# Patient Record
Sex: FEMALE | Race: OTHER | NOT HISPANIC OR LATINO
[De-identification: names, ages, dates, MRNs, and addresses within clinical notes are randomized per-mention and may not be internally consistent; named-entity substitution may affect disease eponyms.]

---

## 2017-02-17 ENCOUNTER — APPOINTMENT (OUTPATIENT)
Dept: ORTHOPEDIC SURGERY | Facility: CLINIC | Age: 62
End: 2017-02-17

## 2017-08-18 ENCOUNTER — APPOINTMENT (OUTPATIENT)
Dept: ORTHOPEDIC SURGERY | Facility: CLINIC | Age: 62
End: 2017-08-18
Payer: COMMERCIAL

## 2017-08-18 VITALS
WEIGHT: 138 LBS | BODY MASS INDEX: 22.99 KG/M2 | DIASTOLIC BLOOD PRESSURE: 80 MMHG | HEART RATE: 54 BPM | SYSTOLIC BLOOD PRESSURE: 131 MMHG | HEIGHT: 65 IN

## 2017-08-18 PROCEDURE — 99214 OFFICE O/P EST MOD 30 MIN: CPT

## 2017-08-18 PROCEDURE — 72100 X-RAY EXAM L-S SPINE 2/3 VWS: CPT

## 2018-08-20 ENCOUNTER — APPOINTMENT (OUTPATIENT)
Dept: ORTHOPEDIC SURGERY | Facility: CLINIC | Age: 63
End: 2018-08-20
Payer: COMMERCIAL

## 2018-08-20 VITALS — DIASTOLIC BLOOD PRESSURE: 81 MMHG | SYSTOLIC BLOOD PRESSURE: 129 MMHG | HEART RATE: 54 BPM

## 2018-08-20 PROCEDURE — 72100 X-RAY EXAM L-S SPINE 2/3 VWS: CPT

## 2018-08-20 PROCEDURE — 99214 OFFICE O/P EST MOD 30 MIN: CPT

## 2018-09-11 ENCOUNTER — TRANSCRIPTION ENCOUNTER (OUTPATIENT)
Age: 63
End: 2018-09-11

## 2018-12-07 ENCOUNTER — APPOINTMENT (OUTPATIENT)
Dept: ORTHOPEDIC SURGERY | Facility: CLINIC | Age: 63
End: 2018-12-07
Payer: COMMERCIAL

## 2018-12-07 VITALS
SYSTOLIC BLOOD PRESSURE: 143 MMHG | HEIGHT: 65 IN | WEIGHT: 137 LBS | BODY MASS INDEX: 22.82 KG/M2 | HEART RATE: 58 BPM | DIASTOLIC BLOOD PRESSURE: 82 MMHG

## 2018-12-07 DIAGNOSIS — M54.5 LOW BACK PAIN: ICD-10-CM

## 2018-12-07 PROCEDURE — 99214 OFFICE O/P EST MOD 30 MIN: CPT

## 2020-04-30 ENCOUNTER — TRANSCRIPTION ENCOUNTER (OUTPATIENT)
Age: 65
End: 2020-04-30

## 2020-08-21 ENCOUNTER — APPOINTMENT (OUTPATIENT)
Dept: ORTHOPEDIC SURGERY | Facility: CLINIC | Age: 65
End: 2020-08-21
Payer: COMMERCIAL

## 2020-08-21 VITALS
HEART RATE: 54 BPM | BODY MASS INDEX: 23.16 KG/M2 | DIASTOLIC BLOOD PRESSURE: 77 MMHG | WEIGHT: 139 LBS | SYSTOLIC BLOOD PRESSURE: 145 MMHG | HEIGHT: 65 IN

## 2020-08-21 VITALS — TEMPERATURE: 97.9 F

## 2020-08-21 PROCEDURE — 99214 OFFICE O/P EST MOD 30 MIN: CPT

## 2020-08-21 PROCEDURE — 72100 X-RAY EXAM L-S SPINE 2/3 VWS: CPT

## 2020-08-21 NOTE — DISCUSSION/SUMMARY
[de-identified] : 4 years s/p L4-5 laminectomy and fusion-doing well.\par Discussed all options.\par F/U 1 year.\par All options discussed including rest, medicine, home exercise, acupuncture, Chiropractic care, Physical Therapy, Pain management, and last resort surgery. All questions were answered, all alternatives discussed and the patient is in complete agreement with that plan. Follow-up appointment as instructed. Any issues and the patient will call or come in sooner.

## 2020-08-21 NOTE — PHYSICAL EXAM
[de-identified] : 5 out of 5 motor strength, sensation is intact and symmetrical full range of motion flexion extension and rotation, no palpatory tenderness full range of motion of hips knees shoulders and elbows (all four extremities), no atrophy, negative straight leg raise, no pathological reflexes, no swelling, normal ambulation, no apparent distress skin is intact, no palpable lymph nodes, no upper or lower extremity instability, alert and oriented x3 and normal mood. Normal finger-to nose test.  [de-identified] : XR AP Lat Lumbar Spine 8/21/20 - L4-5 laminectomy and fusion-reviewed with the patient.\par \par \par Lumbar AP lateral:L4-5 laminectomy and fusion-hardware in place- no breakdown above-Reviewed with patient.

## 2020-08-21 NOTE — HISTORY OF PRESENT ILLNESS
[de-identified] : 65 year female presents for evaluation of presents for follow up.\par SP L3-L5 laminectomy and fusion L4/5 on 7/21/2016, last seen in Dec 2018 for LBP x3 weeks due to back spasm.\par Has been having mid lower back pain during yoga. Only occurs during yoga.\par Denies radiation of pain.\par Denies numbness/tingling. \par Hyperextending her back exacerbates her pain.\par Diclofenac gives mild relief. \axel Is doing PT for her L hip. \par No fever chills sweats nausea vomiting no bowel or bladder dysfunction, no recent weight loss or gain no night pain. This history is in addition to the intake form that I personally reviewed.

## 2020-11-02 ENCOUNTER — TRANSCRIPTION ENCOUNTER (OUTPATIENT)
Age: 65
End: 2020-11-02

## 2020-11-06 ENCOUNTER — TRANSCRIPTION ENCOUNTER (OUTPATIENT)
Age: 65
End: 2020-11-06

## 2021-01-21 ENCOUNTER — TRANSCRIPTION ENCOUNTER (OUTPATIENT)
Age: 66
End: 2021-01-21

## 2021-01-22 ENCOUNTER — TRANSCRIPTION ENCOUNTER (OUTPATIENT)
Age: 66
End: 2021-01-22

## 2021-06-05 ENCOUNTER — INPATIENT (INPATIENT)
Facility: HOSPITAL | Age: 66
LOS: 2 days | Discharge: HOME | DRG: 101 | End: 2021-06-08
Attending: INTERNAL MEDICINE | Admitting: INTERNAL MEDICINE
Payer: MEDICARE

## 2021-06-05 VITALS
DIASTOLIC BLOOD PRESSURE: 76 MMHG | RESPIRATION RATE: 18 BRPM | TEMPERATURE: 99 F | OXYGEN SATURATION: 100 % | HEART RATE: 83 BPM | HEIGHT: 65 IN | WEIGHT: 141.1 LBS | SYSTOLIC BLOOD PRESSURE: 148 MMHG

## 2021-06-05 DIAGNOSIS — D64.9 ANEMIA, UNSPECIFIED: ICD-10-CM

## 2021-06-05 DIAGNOSIS — Z98.89 OTHER SPECIFIED POSTPROCEDURAL STATES: Chronic | ICD-10-CM

## 2021-06-05 DIAGNOSIS — Z29.9 ENCOUNTER FOR PROPHYLACTIC MEASURES, UNSPECIFIED: ICD-10-CM

## 2021-06-05 DIAGNOSIS — R56.9 UNSPECIFIED CONVULSIONS: ICD-10-CM

## 2021-06-05 LAB
ALBUMIN SERPL ELPH-MCNC: 3.8 G/DL — SIGNIFICANT CHANGE UP (ref 3.5–5)
ALP SERPL-CCNC: 58 U/L — SIGNIFICANT CHANGE UP (ref 40–120)
ALT FLD-CCNC: 26 U/L DA — SIGNIFICANT CHANGE UP (ref 10–60)
AMPHET UR-MCNC: NEGATIVE — SIGNIFICANT CHANGE UP
ANION GAP SERPL CALC-SCNC: 7 MMOL/L — SIGNIFICANT CHANGE UP (ref 5–17)
ANISOCYTOSIS BLD QL: SLIGHT — SIGNIFICANT CHANGE UP
APPEARANCE UR: CLEAR — SIGNIFICANT CHANGE UP
AST SERPL-CCNC: 23 U/L — SIGNIFICANT CHANGE UP (ref 10–40)
BARBITURATES UR SCN-MCNC: NEGATIVE — SIGNIFICANT CHANGE UP
BASOPHILS # BLD AUTO: 0.04 K/UL — SIGNIFICANT CHANGE UP (ref 0–0.2)
BASOPHILS NFR BLD AUTO: 0.5 % — SIGNIFICANT CHANGE UP (ref 0–2)
BENZODIAZ UR-MCNC: NEGATIVE — SIGNIFICANT CHANGE UP
BILIRUB SERPL-MCNC: 0.4 MG/DL — SIGNIFICANT CHANGE UP (ref 0.2–1.2)
BILIRUB UR-MCNC: NEGATIVE — SIGNIFICANT CHANGE UP
BUN SERPL-MCNC: 12 MG/DL — SIGNIFICANT CHANGE UP (ref 7–18)
CALCIUM SERPL-MCNC: 8.9 MG/DL — SIGNIFICANT CHANGE UP (ref 8.4–10.5)
CHLORIDE SERPL-SCNC: 106 MMOL/L — SIGNIFICANT CHANGE UP (ref 96–108)
CO2 SERPL-SCNC: 27 MMOL/L — SIGNIFICANT CHANGE UP (ref 22–31)
COCAINE METAB.OTHER UR-MCNC: NEGATIVE — SIGNIFICANT CHANGE UP
COLOR SPEC: YELLOW — SIGNIFICANT CHANGE UP
CREAT SERPL-MCNC: 0.72 MG/DL — SIGNIFICANT CHANGE UP (ref 0.5–1.3)
DIFF PNL FLD: NEGATIVE — SIGNIFICANT CHANGE UP
EOSINOPHIL # BLD AUTO: 0.04 K/UL — SIGNIFICANT CHANGE UP (ref 0–0.5)
EOSINOPHIL NFR BLD AUTO: 0.5 % — SIGNIFICANT CHANGE UP (ref 0–6)
EPI CELLS # UR: SIGNIFICANT CHANGE UP /HPF
GLUCOSE SERPL-MCNC: 76 MG/DL — SIGNIFICANT CHANGE UP (ref 70–99)
GLUCOSE UR QL: NEGATIVE — SIGNIFICANT CHANGE UP
HCT VFR BLD CALC: 31.3 % — LOW (ref 34.5–45)
HGB BLD-MCNC: 10.8 G/DL — LOW (ref 11.5–15.5)
HYPOCHROMIA BLD QL: SIGNIFICANT CHANGE UP
IMM GRANULOCYTES NFR BLD AUTO: 0.3 % — SIGNIFICANT CHANGE UP (ref 0–1.5)
KETONES UR-MCNC: ABNORMAL
LEUKOCYTE ESTERASE UR-ACNC: NEGATIVE — SIGNIFICANT CHANGE UP
LYMPHOCYTES # BLD AUTO: 2 K/UL — SIGNIFICANT CHANGE UP (ref 1–3.3)
LYMPHOCYTES # BLD AUTO: 22.7 % — SIGNIFICANT CHANGE UP (ref 13–44)
MANUAL SMEAR VERIFICATION: SIGNIFICANT CHANGE UP
MCHC RBC-ENTMCNC: 24.6 PG — LOW (ref 27–34)
MCHC RBC-ENTMCNC: 34.5 GM/DL — SIGNIFICANT CHANGE UP (ref 32–36)
MCV RBC AUTO: 71.3 FL — LOW (ref 80–100)
METHADONE UR-MCNC: NEGATIVE — SIGNIFICANT CHANGE UP
MONOCYTES # BLD AUTO: 0.36 K/UL — SIGNIFICANT CHANGE UP (ref 0–0.9)
MONOCYTES NFR BLD AUTO: 4.1 % — SIGNIFICANT CHANGE UP (ref 2–14)
NEUTROPHILS # BLD AUTO: 6.33 K/UL — SIGNIFICANT CHANGE UP (ref 1.8–7.4)
NEUTROPHILS NFR BLD AUTO: 71.9 % — SIGNIFICANT CHANGE UP (ref 43–77)
NITRITE UR-MCNC: NEGATIVE — SIGNIFICANT CHANGE UP
NRBC # BLD: 0 /100 WBCS — SIGNIFICANT CHANGE UP (ref 0–0)
OPIATES UR-MCNC: NEGATIVE — SIGNIFICANT CHANGE UP
PCP SPEC-MCNC: SIGNIFICANT CHANGE UP
PCP UR-MCNC: NEGATIVE — SIGNIFICANT CHANGE UP
PH UR: 8 — SIGNIFICANT CHANGE UP (ref 5–8)
PLAT MORPH BLD: NORMAL — SIGNIFICANT CHANGE UP
PLATELET # BLD AUTO: 397 K/UL — SIGNIFICANT CHANGE UP (ref 150–400)
PLATELET COUNT - ESTIMATE: NORMAL — SIGNIFICANT CHANGE UP
POTASSIUM SERPL-MCNC: 3.9 MMOL/L — SIGNIFICANT CHANGE UP (ref 3.5–5.3)
POTASSIUM SERPL-SCNC: 3.9 MMOL/L — SIGNIFICANT CHANGE UP (ref 3.5–5.3)
PROT SERPL-MCNC: 7.6 G/DL — SIGNIFICANT CHANGE UP (ref 6–8.3)
PROT UR-MCNC: NEGATIVE — SIGNIFICANT CHANGE UP
RBC # BLD: 4.39 M/UL — SIGNIFICANT CHANGE UP (ref 3.8–5.2)
RBC # FLD: 17.6 % — HIGH (ref 10.3–14.5)
RBC BLD AUTO: ABNORMAL
RBC CASTS # UR COMP ASSIST: SIGNIFICANT CHANGE UP /HPF (ref 0–2)
SARS-COV-2 RNA SPEC QL NAA+PROBE: SIGNIFICANT CHANGE UP
SODIUM SERPL-SCNC: 140 MMOL/L — SIGNIFICANT CHANGE UP (ref 135–145)
SP GR SPEC: 1.01 — SIGNIFICANT CHANGE UP (ref 1.01–1.02)
TARGETS BLD QL SMEAR: SIGNIFICANT CHANGE UP
THC UR QL: NEGATIVE — SIGNIFICANT CHANGE UP
UROBILINOGEN FLD QL: NEGATIVE — SIGNIFICANT CHANGE UP
WBC # BLD: 8.8 K/UL — SIGNIFICANT CHANGE UP (ref 3.8–10.5)
WBC # FLD AUTO: 8.8 K/UL — SIGNIFICANT CHANGE UP (ref 3.8–10.5)
WBC UR QL: SIGNIFICANT CHANGE UP /HPF (ref 0–5)

## 2021-06-05 PROCEDURE — 70450 CT HEAD/BRAIN W/O DYE: CPT | Mod: 26,MA

## 2021-06-05 PROCEDURE — 99285 EMERGENCY DEPT VISIT HI MDM: CPT

## 2021-06-05 PROCEDURE — 99232 SBSQ HOSP IP/OBS MODERATE 35: CPT

## 2021-06-05 RX ORDER — SODIUM CHLORIDE 9 MG/ML
1000 INJECTION INTRAMUSCULAR; INTRAVENOUS; SUBCUTANEOUS
Refills: 0 | Status: DISCONTINUED | OUTPATIENT
Start: 2021-06-05 | End: 2021-06-08

## 2021-06-05 RX ORDER — ONDANSETRON 8 MG/1
4 TABLET, FILM COATED ORAL EVERY 6 HOURS
Refills: 0 | Status: DISCONTINUED | OUTPATIENT
Start: 2021-06-05 | End: 2021-06-08

## 2021-06-05 RX ORDER — ACETAMINOPHEN 500 MG
650 TABLET ORAL EVERY 6 HOURS
Refills: 0 | Status: DISCONTINUED | OUTPATIENT
Start: 2021-06-05 | End: 2021-06-08

## 2021-06-05 RX ORDER — SODIUM CHLORIDE 9 MG/ML
1000 INJECTION INTRAMUSCULAR; INTRAVENOUS; SUBCUTANEOUS ONCE
Refills: 0 | Status: COMPLETED | OUTPATIENT
Start: 2021-06-05 | End: 2021-06-05

## 2021-06-05 RX ORDER — ASPIRIN/CALCIUM CARB/MAGNESIUM 324 MG
81 TABLET ORAL DAILY
Refills: 0 | Status: DISCONTINUED | OUTPATIENT
Start: 2021-06-05 | End: 2021-06-08

## 2021-06-05 RX ORDER — ATORVASTATIN CALCIUM 80 MG/1
40 TABLET, FILM COATED ORAL AT BEDTIME
Refills: 0 | Status: DISCONTINUED | OUTPATIENT
Start: 2021-06-05 | End: 2021-06-08

## 2021-06-05 RX ORDER — ENOXAPARIN SODIUM 100 MG/ML
40 INJECTION SUBCUTANEOUS DAILY
Refills: 0 | Status: DISCONTINUED | OUTPATIENT
Start: 2021-06-05 | End: 2021-06-08

## 2021-06-05 RX ADMIN — Medication 81 MILLIGRAM(S): at 11:44

## 2021-06-05 RX ADMIN — SODIUM CHLORIDE 80 MILLILITER(S): 9 INJECTION INTRAMUSCULAR; INTRAVENOUS; SUBCUTANEOUS at 21:49

## 2021-06-05 RX ADMIN — Medication 650 MILLIGRAM(S): at 23:43

## 2021-06-05 RX ADMIN — Medication 650 MILLIGRAM(S): at 21:41

## 2021-06-05 RX ADMIN — SODIUM CHLORIDE 80 MILLILITER(S): 9 INJECTION INTRAMUSCULAR; INTRAVENOUS; SUBCUTANEOUS at 11:46

## 2021-06-05 RX ADMIN — ENOXAPARIN SODIUM 40 MILLIGRAM(S): 100 INJECTION SUBCUTANEOUS at 11:44

## 2021-06-05 RX ADMIN — SODIUM CHLORIDE 2000 MILLILITER(S): 9 INJECTION INTRAMUSCULAR; INTRAVENOUS; SUBCUTANEOUS at 08:19

## 2021-06-05 NOTE — ED ADULT NURSE NOTE - NSIMPLEMENTINTERV_GEN_ALL_ED
Implemented All Universal Safety Interventions:  Burden to call system. Call bell, personal items and telephone within reach. Instruct patient to call for assistance. Room bathroom lighting operational. Non-slip footwear when patient is off stretcher. Physically safe environment: no spills, clutter or unnecessary equipment. Stretcher in lowest position, wheels locked, appropriate side rails in place.

## 2021-06-05 NOTE — H&P ADULT - PROBLEM SELECTOR PLAN 2
noted to have Hgb of 10.8   appears to be stable from prior labs  f/u anemia panel  monitor CBC daily

## 2021-06-05 NOTE — EEG REPORT - NS EEG TEXT BOX
[facility]   COMPREHENSIVE EPILEPSY CENTER   REPORT OF ROUTINE VIDEO EEG     [facility]: [address], [place] [address], Ph#: [phone]  [facility]:  [address], [place] [address], Ph#: [phone]  [facility]: [address] [place] [address], Ph#: [phone]    Patient Name: LONDON MONTGOMERY  Age and : 65y ([birth year])  MRN #: [id number]  Location: [place] [address]  Referring Physician: Stefan Esqueda    Study Date: 21    _____________________________________________________________  TECHNICAL INFORMATION    Placement and Labeling of Electrodes:  The EEG was performed utilizing 20 channels referential EEG connections (coronal over temporal over parasagittal montage) using all standard 10-20 electrode placements with EKG.  Recording was at a sampling rate of 256 samples per second per channel.  Time synchronized digital video recording was done simultaneously with EEG recording.  A low light infrared camera was used for low light recording.  Virgilio and seizure detection algorithms were utilized.    _____________________________________________________________  HISTORY    Patient is a 65y old  Female who presents with a chief complaint of seizure (2021 13:01)    PERTINENT MEDICATION:  none  _____________________________________________________________  STUDY INTERPRETATION    Findings: The background was continuous, spontaneously variable and reactive. During wakefulness, the posterior dominant rhythm consisted of symmetric, well-modulated 9 Hz activity, with amplitude to 30 uV, that attenuated to eye opening.  Low amplitude frontal beta was noted in wakefulness.    Background Slowing:  No generalized background slowing was present.    Focal Slowing:   None were present.    Sleep Background:  Stage II sleep transients were not recorded.    Other Non-Epileptiform Findings:  Diffuse excess beta activity.    Interictal Epileptiform Activity:   None were present.    Events:  Clinical events: None recorded.  Seizures: None recorded.    Activation Procedures:   Hyperventilation was not performed.    Photic stimulation was not performed.     Artifacts:  Intermittent myogenic and movement artifacts were noted.    ECG:  The heart rate on single channel ECG was predominantly between 60-70 BPM.    _____________________________________________________________  EEG SUMMARY/CLASSIFICATION    Normal EEG in the awake, drowsy and asleep states.  .Diffuse excess beta activity.    _____________________________________________________________  EEG IMPRESSION/CLINICAL CORRELATE    Otherwise normal EEG, except for diffuse excess beta activity, which may be seen with medication use such as benzodiazepines or barbiturates.  No epileptiform pattern or seizure seen.    Isaac Arriaga MD PGY-5  Epilepsy Fellow    This Preliminary report is based on fellow review. Final report pending attending review.    Reading Room: [phone]  On Call Service After Hours: [phone] [facility]   COMPREHENSIVE EPILEPSY CENTER   REPORT OF ROUTINE VIDEO EEG     [facility]: [address], [place] [address], Ph#: [phone]  [facility]:  [address], [place] [address], Ph#: [phone]  [facility]: [address] [place] [address], Ph#: [phone]    Patient Name: LONDON MONTGOMERY  Age and : 65y (57)  MRN #: [id number]  Location: [place] [address]  Referring Physician: Stefan Esqueda    Study Date: 21    _____________________________________________________________  TECHNICAL INFORMATION    Placement and Labeling of Electrodes:  The EEG was performed utilizing 20 channels referential EEG connections (coronal over temporal over parasagittal montage) using all standard 10-20 electrode placements with EKG.  Recording was at a sampling rate of 256 samples per second per channel.  Time synchronized digital video recording was done simultaneously with EEG recording.  A low light infrared camera was used for low light recording.  Virgilio and seizure detection algorithms were utilized.    _____________________________________________________________  HISTORY    Patient is a 65y old  Female who presents with a chief complaint of seizure (2021 13:01)    PERTINENT MEDICATION:  none  _____________________________________________________________  STUDY INTERPRETATION    Findings: The background was continuous, spontaneously variable and reactive. During wakefulness, the posterior dominant rhythm consisted of symmetric, well-modulated 9 Hz activity, with amplitude to 30 uV, that attenuated to eye opening.  Low amplitude frontal beta was noted in wakefulness.    Background Slowing:  No generalized background slowing was present.    Focal Slowing:   Intermittent polymorphic delta slowing in the left anterior-temporal (max F7) region.    Sleep Background:  Stage II sleep transients were not recorded.    Other Non-Epileptiform Findings:  Diffuse excess beta activity.    Interictal Epileptiform Activity:   One possible left anterior-temporal (max F7) spike-and-slow wave complex.     Events:  Clinical events: None recorded.  Seizures: None recorded.    Activation Procedures:   Hyperventilation was not performed.    Photic stimulation was not performed.     Artifacts:  Intermittent myogenic and movement artifacts were noted.    ECG:  The heart rate on single channel ECG was predominantly between 60-70 BPM.    _____________________________________________________________  EEG SUMMARY/CLASSIFICATION    Abnormal EEG in the awake, drowsy and asleep states.  - One possible left anterior-temporal (max F7) spike-and-slow wave complex.   - Intermittent polymorphic delta slowing in the left anterior-temporal (max F7) region.  - Diffuse excess beta activity.    _____________________________________________________________  EEG IMPRESSION/CLINICAL CORRELATE    Abnormal EEG study.  1. One possible left anterior-temporal discharge, suggesting potential epileptiform focus. If clinically indicated, recommend prolonged recording for further evaluation.   2. Functional abnormality in the left anterior-temporal region.   3. Diffuse excess beta activity may be seen with medication use such as benzodiazepines or barbiturates.  4. No seizure seen.     _____________________________________________________________    Isaac Arriaga MD PGY-5  Epilepsy Fellow    Timothy Abdul MD  Attending Physician, [facility]     Reading Room: [phone]  On Call Service After Hours: [phone]

## 2021-06-05 NOTE — H&P ADULT - ASSESSMENT
Patient is a 65 year old female from home AAO x3, with PMH of Raynaud's phenomenon, who presented to the ED due to new onset seizure.    Hgb of 10.8. CT head negative. Given 1L NS bolus in ED.

## 2021-06-05 NOTE — H&P ADULT - PROBLEM SELECTOR PLAN 1
patient presented to the ED due to new onset seizure  CT head negative   UA and Utox negative   f/u MRI with epilepsy protocol; MRI screening form done and placed in chart   f/u EEG   neuro, Dr. Taylor consulted  seizure precautions

## 2021-06-05 NOTE — H&P ADULT - NSHPSOCIALHISTORY_GEN_ALL_CORE
Patient lives at home with her . Denies any smoking, alcohol use or use of illicit drugs. Patient states only occasionally using THC aid to help with sleeping.

## 2021-06-05 NOTE — ED ADULT TRIAGE NOTE - CHIEF COMPLAINT QUOTE
As per  he was woken up by pt whimpering then scream and then went into convulsions that lasted 2-3 mins, she was foaming a little from the mouth and he noticed saliva in her mouth. pt denies having history of seizure.

## 2021-06-05 NOTE — CONSULT NOTE ADULT - SUBJECTIVE AND OBJECTIVE BOX
Patient is a 65y old  Female who presents with a chief complaint of seizure (05 Jun 2021 09:58)      HPI:  Seizure generalized observed/witnessed by her , in middle of sleep; tongue laceration occured; also reports occasional strange smells.    PAST MEDICAL & SURGICAL HISTORY:  Raynauds phenomenon    History of D&amp;C    H/O colonoscopy        FAMILY HISTORY:        Social Hx:  Nonsmoker, no drug or alcohol use    MEDICATIONS  (STANDING):  aspirin  chewable 81 milliGRAM(s) Oral daily  atorvastatin 40 milliGRAM(s) Oral at bedtime  enoxaparin Injectable 40 milliGRAM(s) SubCutaneous daily  sodium chloride 0.9%. 1000 milliLiter(s) (80 mL/Hr) IV Continuous <Continuous>       Allergies    Bactrim (Hives)    Intolerances        ROS: Pertinent positives in HPI, all other ROS were reviewed and are negative.      Vital Signs Last 24 Hrs  T(C): 37.9 (05 Jun 2021 11:48), Max: 37.9 (05 Jun 2021 11:48)  T(F): 100.3 (05 Jun 2021 11:48), Max: 100.3 (05 Jun 2021 11:48)  HR: 68 (05 Jun 2021 11:48) (68 - 83)  BP: 97/62 (05 Jun 2021 11:48) (97/62 - 148/76)  BP(mean): --  RR: 15 (05 Jun 2021 11:48) (15 - 18)  SpO2: 99% (05 Jun 2021 11:48) (99% - 100%)        Constitutional: awake and alert.  HEENT: PERRLA, EOMI,   Neck: Supple.  Respiratory: Breath sounds are clear bilaterally  Cardiovascular: S1 and S2, regular rhythm  Gastrointestinal: soft, nontender  Extremities:  no edema  Vascular: Carotid Bruit - no  Musculoskeletal: no joint swelling/tenderness, no abnormal movements  Skin: No rashes    Neurological exam:  HF: A x O x 3. Appropriately interactive, normal affect. Speech fluent, No Aphasia or paraphasic errors. Naming /repetition intact   CN: KELVIN, EOMI, VFF, facial sensation normal, no NLFD, tongue midline, Palate moves equally, SCM equal bilaterally  Motor: No pronator drift, Strength 5/5 in all 4 ext, normal bulk and tone, no tremor, rigidity or bradykinesia.    Sens: Intact to light touch / PP/ VS/ JS    Reflexes: Symmetric and normal . BJ 2+, BR 2+, KJ 2+, AJ 2+, downgoing toes b/l  Coord:  No FNFA, dysmetria, STEPHEN intact   Gait/Balance: Normal/  NIHSS: 0  MRS 0          Labs:                        10.8   8.80  )-----------( 397      ( 05 Jun 2021 08:22 )             31.3     06-05    140  |  106  |  12  ----------------------------<  76  3.9   |  27  |  0.72    Ca    8.9      05 Jun 2021 08:22    TPro  7.6  /  Alb  3.8  /  TBili  0.4  /  DBili  x   /  AST  23  /  ALT  26  /  AlkPhos  58  06-05            Radiology report:  - CT head:  < from: CT Head No Cont (06.05.21 @ 08:41) >  EXAM:  CT BRAIN                            PROCEDURE DATE:  06/05/2021          INTERPRETATION:  CLINICAL STATEMENT: Seizure    TECHNIQUE: CT of the head was performed without IV contrast.  RAPID artificial intelligence was utilized for the preliminary evaluation of intracranial hemorrhage.    COMPARISON: None.    FINDINGS:  There is no acute intracranial hemorrhage, parenchymal mass, mass effect or midline shift. There is no acute territorial infarct. There is no hydrocephalus.    The cranium is intact. The visualized paranasal sinuses are well-aerated.    IMPRESSION:  No acute intracranial hemorrhage or acute territorial infarct.  If symptoms persist, follow-up MRI exam recommended.      TERRELL HELTON MD; Attending Radiologist  This document has been electronically signed. Jun 5 2021  8:44AM    < end of copied text >

## 2021-06-05 NOTE — ED PROVIDER NOTE - OBJECTIVE STATEMENT
66 y/o F presents to the ED s/p seizure.  witnessed patient have a generalized seizure in bed that lasted 1-2 minutes. Patient reportedly not acting like herself until 40 minutes after. Patient is complaining of L sided tongue pain and believes she may have bitten her tongue. Denies headache, neck pain, abdominal pain, chest pain, shortness of breath or any other acute complaints.

## 2021-06-05 NOTE — H&P ADULT - NSHPSOURCEINFORD_GEN_ALL_CORE
Notified pt of results. Summary   This is a limited echo to assess EF and RVSP. Left ventricular systolic function is normal with an ejection fraction of   55-60%. No regional wall motion abnormality. Mildly enlarged right heart. Mild tricuspid regurgitation. Right ventricular systolic pressure of 41 mmHg consistent with mild   pulmonary hypertension. No evidence of pericardial effusion. Chart(s)/Patient/Spouse/Significant Other

## 2021-06-05 NOTE — H&P ADULT - HISTORY OF PRESENT ILLNESS
Patient is a 65 year old female from home AAO x3, with PMH of Raynaud's phenomenon and spinal surgery, who presented to the ED due to  new onset seizure. Patient states that around 3:45am she had gotten up from bed and then went back to sleep and then does not recall what happened afterwards.  at bedside to provide further history.  states that patient was noted to be moaning around 5am as if she was having a nightmare which has happened before. Patient then had screamed and then went back to the bed and noticed her eyes were half rolled back and patient was convulsing. Patient had bit her tongue and also had some drooling/froth from her mouth. Seizure lasted about 2 minutes and patient was post ictal for about 30-40 mins prior to returning to baseline mental status. Patient denies any prior history of seizures. Denies any smoking, drinking or illicit drug use. Patient states that this past week, she had two episodes where she felt like she smelled something bad like rotten eggs and had whole body shake but denies any loss of consciousness or confusion during episode. Patient gets migraine about 1-2x/year but is not on any medications.

## 2021-06-05 NOTE — CONSULT NOTE ADULT - ASSESSMENT
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

## 2021-06-06 LAB
24R-OH-CALCIDIOL SERPL-MCNC: 34.4 NG/ML — SIGNIFICANT CHANGE UP (ref 30–80)
A1C WITH ESTIMATED AVERAGE GLUCOSE RESULT: 5 % — SIGNIFICANT CHANGE UP (ref 4–5.6)
ANION GAP SERPL CALC-SCNC: 8 MMOL/L — SIGNIFICANT CHANGE UP (ref 5–17)
APTT BLD: 29.2 SEC — SIGNIFICANT CHANGE UP (ref 27.5–35.5)
BASOPHILS # BLD AUTO: 0.05 K/UL — SIGNIFICANT CHANGE UP (ref 0–0.2)
BASOPHILS NFR BLD AUTO: 0.6 % — SIGNIFICANT CHANGE UP (ref 0–2)
BUN SERPL-MCNC: 9 MG/DL — SIGNIFICANT CHANGE UP (ref 7–18)
CALCIUM SERPL-MCNC: 8.5 MG/DL — SIGNIFICANT CHANGE UP (ref 8.4–10.5)
CHLORIDE SERPL-SCNC: 110 MMOL/L — HIGH (ref 96–108)
CHOLEST SERPL-MCNC: 207 MG/DL — HIGH
CO2 SERPL-SCNC: 24 MMOL/L — SIGNIFICANT CHANGE UP (ref 22–31)
COVID-19 SPIKE DOMAIN AB INTERP: POSITIVE
COVID-19 SPIKE DOMAIN ANTIBODY RESULT: >250 U/ML — HIGH
CREAT SERPL-MCNC: 0.64 MG/DL — SIGNIFICANT CHANGE UP (ref 0.5–1.3)
CULTURE RESULTS: SIGNIFICANT CHANGE UP
EOSINOPHIL # BLD AUTO: 0.33 K/UL — SIGNIFICANT CHANGE UP (ref 0–0.5)
EOSINOPHIL NFR BLD AUTO: 4.1 % — SIGNIFICANT CHANGE UP (ref 0–6)
ESTIMATED AVERAGE GLUCOSE: 97 MG/DL — SIGNIFICANT CHANGE UP (ref 68–114)
FERRITIN SERPL-MCNC: 22 NG/ML — SIGNIFICANT CHANGE UP (ref 15–150)
FOLATE SERPL-MCNC: >20 NG/ML — SIGNIFICANT CHANGE UP
GLUCOSE SERPL-MCNC: 85 MG/DL — SIGNIFICANT CHANGE UP (ref 70–99)
HCT VFR BLD CALC: 27.4 % — LOW (ref 34.5–45)
HCV AB S/CO SERPL IA: 0.12 S/CO — SIGNIFICANT CHANGE UP (ref 0–0.99)
HCV AB SERPL-IMP: SIGNIFICANT CHANGE UP
HDLC SERPL-MCNC: 101 MG/DL — SIGNIFICANT CHANGE UP
HGB BLD-MCNC: 9.6 G/DL — LOW (ref 11.5–15.5)
IMM GRANULOCYTES NFR BLD AUTO: 0.4 % — SIGNIFICANT CHANGE UP (ref 0–1.5)
INR BLD: 1.1 RATIO — SIGNIFICANT CHANGE UP (ref 0.88–1.16)
IRON SATN MFR SERPL: 26 % — SIGNIFICANT CHANGE UP (ref 15–50)
IRON SATN MFR SERPL: 81 UG/DL — SIGNIFICANT CHANGE UP (ref 40–150)
LIPID PNL WITH DIRECT LDL SERPL: 94 MG/DL — SIGNIFICANT CHANGE UP
LYMPHOCYTES # BLD AUTO: 3.34 K/UL — HIGH (ref 1–3.3)
LYMPHOCYTES # BLD AUTO: 41.5 % — SIGNIFICANT CHANGE UP (ref 13–44)
MAGNESIUM SERPL-MCNC: 2.2 MG/DL — SIGNIFICANT CHANGE UP (ref 1.6–2.6)
MCHC RBC-ENTMCNC: 24.9 PG — LOW (ref 27–34)
MCHC RBC-ENTMCNC: 35 GM/DL — SIGNIFICANT CHANGE UP (ref 32–36)
MCV RBC AUTO: 71 FL — LOW (ref 80–100)
MONOCYTES # BLD AUTO: 0.47 K/UL — SIGNIFICANT CHANGE UP (ref 0–0.9)
MONOCYTES NFR BLD AUTO: 5.8 % — SIGNIFICANT CHANGE UP (ref 2–14)
NEUTROPHILS # BLD AUTO: 3.83 K/UL — SIGNIFICANT CHANGE UP (ref 1.8–7.4)
NEUTROPHILS NFR BLD AUTO: 47.6 % — SIGNIFICANT CHANGE UP (ref 43–77)
NON HDL CHOLESTEROL: 106 MG/DL — SIGNIFICANT CHANGE UP
NRBC # BLD: 0 /100 WBCS — SIGNIFICANT CHANGE UP (ref 0–0)
PHOSPHATE SERPL-MCNC: 3.2 MG/DL — SIGNIFICANT CHANGE UP (ref 2.5–4.5)
PLATELET # BLD AUTO: 365 K/UL — SIGNIFICANT CHANGE UP (ref 150–400)
POTASSIUM SERPL-MCNC: 3.7 MMOL/L — SIGNIFICANT CHANGE UP (ref 3.5–5.3)
POTASSIUM SERPL-SCNC: 3.7 MMOL/L — SIGNIFICANT CHANGE UP (ref 3.5–5.3)
PROTHROM AB SERPL-ACNC: 13 SEC — SIGNIFICANT CHANGE UP (ref 10.6–13.6)
RBC # BLD: 3.86 M/UL — SIGNIFICANT CHANGE UP (ref 3.8–5.2)
RBC # FLD: 17.8 % — HIGH (ref 10.3–14.5)
SARS-COV-2 IGG+IGM SERPL QL IA: >250 U/ML — HIGH
SARS-COV-2 IGG+IGM SERPL QL IA: POSITIVE
SODIUM SERPL-SCNC: 142 MMOL/L — SIGNIFICANT CHANGE UP (ref 135–145)
SPECIMEN SOURCE: SIGNIFICANT CHANGE UP
TIBC SERPL-MCNC: 313 UG/DL — SIGNIFICANT CHANGE UP (ref 250–450)
TRIGL SERPL-MCNC: 62 MG/DL — SIGNIFICANT CHANGE UP
UIBC SERPL-MCNC: 232 UG/DL — SIGNIFICANT CHANGE UP (ref 110–370)
VIT B12 SERPL-MCNC: 571 PG/ML — SIGNIFICANT CHANGE UP (ref 232–1245)
WBC # BLD: 8.05 K/UL — SIGNIFICANT CHANGE UP (ref 3.8–10.5)
WBC # FLD AUTO: 8.05 K/UL — SIGNIFICANT CHANGE UP (ref 3.8–10.5)

## 2021-06-06 PROCEDURE — 71045 X-RAY EXAM CHEST 1 VIEW: CPT | Mod: 26

## 2021-06-06 PROCEDURE — 99231 SBSQ HOSP IP/OBS SF/LOW 25: CPT

## 2021-06-06 RX ADMIN — ENOXAPARIN SODIUM 40 MILLIGRAM(S): 100 INJECTION SUBCUTANEOUS at 11:40

## 2021-06-06 RX ADMIN — SODIUM CHLORIDE 80 MILLILITER(S): 9 INJECTION INTRAMUSCULAR; INTRAVENOUS; SUBCUTANEOUS at 06:03

## 2021-06-06 RX ADMIN — Medication 81 MILLIGRAM(S): at 11:40

## 2021-06-06 NOTE — CHART NOTE - NSCHARTNOTEFT_GEN_A_CORE
1/8/19 saw pt today with Dr. Nelli Rodriguez for pre chemo cycle 2 FOLFIRINOX. She is recovering from recent hospitalization for renal infarct. She is on blood thinners. PO intake is improving slowly. Will change percocet to oxycodone per insurance approval.  Will delay cycle 2 for 1 week. Encouraged to call with any concerns. Navigation will continue to follow. EVENT: Low grade fever: Patient admitted for new onet seizure now with low grade fever. UA negative. No leukocytosis.  Patient asymptomatic. CXR ordered to r/o infectious process. Acetaminophen ordered PRN for fever    OBJECTIVE:  Vital Signs Last 24 Hrs  T(C): 37.1 (06 Jun 2021 05:32), Max: 38.2 (05 Jun 2021 20:54)  T(F): 98.7 (06 Jun 2021 05:32), Max: 100.7 (05 Jun 2021 20:54)  HR: 57 (06 Jun 2021 05:32) (57 - 71)  BP: 93/56 (06 Jun 2021 05:32) (93/56 - 120/72)  BP(mean): --  RR: 18 (06 Jun 2021 05:32) (15 - 18)  SpO2: 98% (06 Jun 2021 05:32) (96% - 100%)    FOCUSED PHYSICAL EXAM:    LABS:                        10.8   8.80  )-----------( 397      ( 05 Jun 2021 08:22 )             31.3     06-05    140  |  106  |  12  ----------------------------<  76  3.9   |  27  |  0.72    Ca    8.9      05 Jun 2021 08:22    TPro  7.6  /  Alb  3.8  /  TBili  0.4  /  DBili  x   /  AST  23  /  ALT  26  /  AlkPhos  58  06-05      EKG:   IMGAGING:    ASSESSMENT:  HPI:  Patient is a 65 year old female from home AAO x3, with PMH of Raynaud's phenomenon and spinal surgery, who presented to the ED due to  new onset seizure. Patient states that around 3:45am she had gotten up from bed and then went back to sleep and then does not recall what happened afterwards.  at bedside to provide further history.  states that patient was noted to be moaning around 5am as if she was having a nightmare which has happened before. Patient then had screamed and then went back to the bed and noticed her eyes were half rolled back and patient was convulsing. Patient had bit her tongue and also had some drooling/froth from her mouth. Seizure lasted about 2 minutes and patient was post ictal for about 30-40 mins prior to returning to baseline mental status. Patient denies any prior history of seizures. Denies any smoking, drinking or illicit drug use. Patient states that this past week, she had two episodes where she felt like she smelled something bad like rotten eggs and had whole body shake but denies any loss of consciousness or confusion during episode. Patient gets migraine about 1-2x/year but is not on any medications.  (05 Jun 2021 09:58)      PLAN: Fever of unknown origin  - CXR ordered. Follow up  -Continue Tylenol pRN for fever    FOLLOW UP / RESULT:

## 2021-06-07 LAB
ANION GAP SERPL CALC-SCNC: 8 MMOL/L — SIGNIFICANT CHANGE UP (ref 5–17)
BASOPHILS # BLD AUTO: 0.03 K/UL — SIGNIFICANT CHANGE UP (ref 0–0.2)
BASOPHILS NFR BLD AUTO: 0.4 % — SIGNIFICANT CHANGE UP (ref 0–2)
BUN SERPL-MCNC: 13 MG/DL — SIGNIFICANT CHANGE UP (ref 7–18)
CALCIUM SERPL-MCNC: 8.6 MG/DL — SIGNIFICANT CHANGE UP (ref 8.4–10.5)
CHLORIDE SERPL-SCNC: 108 MMOL/L — SIGNIFICANT CHANGE UP (ref 96–108)
CO2 SERPL-SCNC: 26 MMOL/L — SIGNIFICANT CHANGE UP (ref 22–31)
CREAT SERPL-MCNC: 0.76 MG/DL — SIGNIFICANT CHANGE UP (ref 0.5–1.3)
EOSINOPHIL # BLD AUTO: 0.37 K/UL — SIGNIFICANT CHANGE UP (ref 0–0.5)
EOSINOPHIL NFR BLD AUTO: 5.3 % — SIGNIFICANT CHANGE UP (ref 0–6)
GLUCOSE SERPL-MCNC: 80 MG/DL — SIGNIFICANT CHANGE UP (ref 70–99)
HCT VFR BLD CALC: 29.1 % — LOW (ref 34.5–45)
HGB BLD-MCNC: 10.1 G/DL — LOW (ref 11.5–15.5)
IMM GRANULOCYTES NFR BLD AUTO: 0.1 % — SIGNIFICANT CHANGE UP (ref 0–1.5)
LYMPHOCYTES # BLD AUTO: 3.69 K/UL — HIGH (ref 1–3.3)
LYMPHOCYTES # BLD AUTO: 53.3 % — HIGH (ref 13–44)
MAGNESIUM SERPL-MCNC: 2 MG/DL — SIGNIFICANT CHANGE UP (ref 1.6–2.6)
MCHC RBC-ENTMCNC: 24.8 PG — LOW (ref 27–34)
MCHC RBC-ENTMCNC: 34.7 GM/DL — SIGNIFICANT CHANGE UP (ref 32–36)
MCV RBC AUTO: 71.5 FL — LOW (ref 80–100)
MONOCYTES # BLD AUTO: 0.42 K/UL — SIGNIFICANT CHANGE UP (ref 0–0.9)
MONOCYTES NFR BLD AUTO: 6.1 % — SIGNIFICANT CHANGE UP (ref 2–14)
NEUTROPHILS # BLD AUTO: 2.4 K/UL — SIGNIFICANT CHANGE UP (ref 1.8–7.4)
NEUTROPHILS NFR BLD AUTO: 34.8 % — LOW (ref 43–77)
NRBC # BLD: 0 /100 WBCS — SIGNIFICANT CHANGE UP (ref 0–0)
PHOSPHATE SERPL-MCNC: 3.9 MG/DL — SIGNIFICANT CHANGE UP (ref 2.5–4.5)
PLATELET # BLD AUTO: 342 K/UL — SIGNIFICANT CHANGE UP (ref 150–400)
POTASSIUM SERPL-MCNC: 3.7 MMOL/L — SIGNIFICANT CHANGE UP (ref 3.5–5.3)
POTASSIUM SERPL-SCNC: 3.7 MMOL/L — SIGNIFICANT CHANGE UP (ref 3.5–5.3)
RBC # BLD: 4.07 M/UL — SIGNIFICANT CHANGE UP (ref 3.8–5.2)
RBC # FLD: 17.9 % — HIGH (ref 10.3–14.5)
SODIUM SERPL-SCNC: 142 MMOL/L — SIGNIFICANT CHANGE UP (ref 135–145)
WBC # BLD: 6.92 K/UL — SIGNIFICANT CHANGE UP (ref 3.8–10.5)
WBC # FLD AUTO: 6.92 K/UL — SIGNIFICANT CHANGE UP (ref 3.8–10.5)

## 2021-06-07 PROCEDURE — 70553 MRI BRAIN STEM W/O & W/DYE: CPT | Mod: 26

## 2021-06-07 NOTE — PROGRESS NOTE ADULT - SUBJECTIVE AND OBJECTIVE BOX
INTERVAL HPI/OVERNIGHT EVENTS:  Low grade fever: Patient admitted for new onet seizure now with low grade fever. UA negative. No leukocytosis.  Patient asymptomatic. CXR ordered to r/o infectious process. Acetaminophen ordered PRN for fever    HEALTH ISSUES - PROBLEM Dx:  Seizure  Anemia  Need for prophylactic measure            MEDICATIONS  (STANDING):  aspirin  chewable 81 milliGRAM(s) Oral daily  atorvastatin 40 milliGRAM(s) Oral at bedtime  enoxaparin Injectable 40 milliGRAM(s) SubCutaneous daily  sodium chloride 0.9%. 1000 milliLiter(s) (80 mL/Hr) IV Continuous <Continuous>    MEDICATIONS  (PRN):  acetaminophen   Tablet .. 650 milliGRAM(s) Oral every 6 hours PRN Temp greater or equal to 38C (100.4F), Moderate Pain (4 - 6)  LORazepam   Injectable 2 milliGRAM(s) IV Push every 4 hours PRN seizure  ondansetron Injectable 4 milliGRAM(s) IV Push every 6 hours PRN Nausea and/or Vomiting      Allergies    Bactrim (Hives)    Intolerances        REVIEW OF SYSTEMS        Vital Signs Last 24 Hrs  T(C): 37 (2021 13:57), Max: 38.2 (2021 20:54)  T(F): 98.6 (2021 13:57), Max: 100.7 (2021 20:54)  HR: 63 (2021 13:57) (57 - 71)  BP: 100/64 (2021 13:57) (93/56 - 120/72)  BP(mean): --  RR: 16 (2021 13:57) (16 - 18)  SpO2: 98% (2021 13:57) (96% - 99%)    PHYSICAL EXAM:    Constitutional: awake and alert.  HEENT: PERRLA, EOMI,   Neck: Supple.  Respiratory: Breath sounds are clear bilaterally  Cardiovascular: S1 and S2, regular rhythm  Gastrointestinal: soft, nontender  Extremities:  no edema  Vascular: Carotid Bruit - no  Musculoskeletal: no joint swelling/tenderness, no abnormal movements  Skin: No rashes    Neurological exam:  HF: A x O x 3. Appropriately interactive, normal affect. Speech fluent, No Aphasia or paraphasic errors. Naming /repetition intact   CN: KELVIN, EOMI, VFF, facial sensation normal, no NLFD, tongue midline, Palate moves equally, SCM equal bilaterally  Motor: No pronator drift, Strength 5/5 in all 4 ext, normal bulk and tone, no tremor, rigidity or bradykinesia.    Sens: Intact to light touch / PP/ VS/ JS    Reflexes: Symmetric and normal . BJ 2+, BR 2+, KJ 2+, AJ 2+, downgoing toes b/l  Coord:  No FNFA, dysmetria, STEPHEN intact   Gait/Balance: Normal/  NIHSS: 0  MRS 0    LABS:                        9.6    8.05  )-----------( 365      ( 2021 07:35 )             27.4     06-06    142  |  110<H>  |  9   ----------------------------<  85  3.7   |  24  |  0.64    Ca    8.5      2021 07:35  Phos  3.2     06-06  Mg     2.2     06-06    TPro  7.6  /  Alb  3.8  /  TBili  0.4  /  DBili  x   /  AST  23  /  ALT  26  /  AlkPhos  58  06-05    PT/INR - ( 2021 07:35 )   PT: 13.0 sec;   INR: 1.10 ratio         PTT - ( 2021 07:35 )  PTT:29.2 sec  Urinalysis Basic - ( 2021 09:32 )    Color: Yellow / Appearance: Clear / S.015 / pH: x  Gluc: x / Ketone: Trace  / Bili: Negative / Urobili: Negative   Blood: x / Protein: Negative / Nitrite: Negative   Leuk Esterase: Negative / RBC: 0-2 /HPF / WBC 0-2 /HPF   Sq Epi: x / Non Sq Epi: Few /HPF / Bacteria: x        RADIOLOGY & ADDITIONAL TESTS:  < from: CT Head No Cont (21 @ 08:41) >  EXAM:  CT BRAIN                            PROCEDURE DATE:  2021          INTERPRETATION:  CLINICAL STATEMENT: Seizure    TECHNIQUE: CT of the head was performed without IV contrast.  RAPID artificial intelligence was utilized for the preliminary evaluation of intracranial hemorrhage.    COMPARISON: None.    FINDINGS:  There is no acute intracranial hemorrhage, parenchymal mass, mass effect or midline shift. There is no acute territorial infarct. There is no hydrocephalus.    The cranium is intact. The visualized paranasal sinuses are well-aerated.    IMPRESSION:  No acute intracranial hemorrhage or acute territorial infarct.  If symptoms persist, follow-up MRI exam recommended.            TERRELL HELTON MD; Attending Radiologist  This document has been electronically signed. 2021  8:44AM    < end of copied text >  EEG  HISTORY    Patient is a 65y old  Female who presents with a chief complaint of seizure (2021 13:01)    PERTINENT MEDICATION:  none  _____________________________________________________________  STUDY INTERPRETATION    Findings: The background was continuous, spontaneously variable and reactive. During wakefulness, the posterior dominant rhythm consisted of symmetric, well-modulated 9 Hz activity, with amplitude to 30 uV, that attenuated to eye opening.  Low amplitude frontal beta was noted in wakefulness.    Background Slowing:  No generalized background slowing was present.    Focal Slowing:   Intermittent polymorphic delta slowing in the left anterior-temporal (max F7) region.    Sleep Background:  Stage II sleep transients were not recorded.    Other Non-Epileptiform Findings:  Diffuse excess beta activity.    Interictal Epileptiform Activity:   One possible left anterior-temporal (max F7) spike-and-slow wave complex.     Events:  Clinical events: None recorded.  Seizures: None recorded.    Activation Procedures:   Hyperventilation was not performed.    Photic stimulation was not performed.     Artifacts:  Intermittent myogenic and movement artifacts were noted.    ECG:  The heart rate on single channel ECG was predominantly between 60-70 BPM.    _____________________________________________________________  EEG SUMMARY/CLASSIFICATION    Abnormal EEG in the awake, drowsy and asleep states.  - One possible left anterior-temporal (max F7) spike-and-slow wave complex.   - Intermittent polymorphic delta slowing in the left anterior-temporal (max F7) region.  - Diffuse excess beta activity.    _____________________________________________________________  EEG IMPRESSION/CLINICAL CORRELATE    Abnormal EEG study.  1. One possible left anterior-temporal discharge, suggesting potential epileptiform focus. If clinically indicated, recommend prolonged recording for further evaluation.   2. Functional abnormality in the left anterior-temporal region.   3. Diffuse excess beta activity may be seen with medication use such as benzodiazepines or barbiturates.  4. No seizure seen.     _____________________________________________________________    Isaac Arriaga MD PGY-5  Epilepsy Fellow    Timothy Abdul MD  Attending Physician, [facility]     Reading Room: [phone]  On Call Service After Hours: [phone]
NP Note discussed with  Primary Attending    Patient is a 65y old  Female who presents with a chief complaint of seizure (06 Jun 2021 17:29)      INTERVAL HPI/OVERNIGHT EVENTS: No acute medical complaints    MEDICATIONS  (STANDING):  aspirin  chewable 81 milliGRAM(s) Oral daily  atorvastatin 40 milliGRAM(s) Oral at bedtime  enoxaparin Injectable 40 milliGRAM(s) SubCutaneous daily  sodium chloride 0.9%. 1000 milliLiter(s) (80 mL/Hr) IV Continuous <Continuous>    MEDICATIONS  (PRN):  acetaminophen   Tablet .. 650 milliGRAM(s) Oral every 6 hours PRN Temp greater or equal to 38C (100.4F), Moderate Pain (4 - 6)  LORazepam   Injectable 2 milliGRAM(s) IV Push every 4 hours PRN seizure  ondansetron Injectable 4 milliGRAM(s) IV Push every 6 hours PRN Nausea and/or Vomiting      __________________________________________________  REVIEW OF SYSTEMS:    CONSTITUTIONAL: No fever,   EYES: no acute visual disturbances  NECK: No pain or stiffness  RESPIRATORY: No cough; No shortness of breath  CARDIOVASCULAR: No chest pain, no palpitations  GASTROINTESTINAL: No pain. No nausea or vomiting; No diarrhea   NEUROLOGICAL: No headache or numbness, no tremors  MUSCULOSKELETAL: No joint pain, no muscle pain  GENITOURINARY: no dysuria, no frequency, no hesitancy  PSYCHIATRY: no depression , no anxiety  ALL OTHER  ROS negative        Vital Signs Last 24 Hrs  T(C): 36.7 (07 Jun 2021 06:04), Max: 37 (06 Jun 2021 13:57)  T(F): 98.1 (07 Jun 2021 06:04), Max: 98.6 (06 Jun 2021 13:57)  HR: 58 (07 Jun 2021 06:04) (58 - 63)  BP: 122/68 (07 Jun 2021 06:04) (100/64 - 122/68)  BP(mean): --  RR: 17 (07 Jun 2021 06:04) (16 - 17)  SpO2: 98% (07 Jun 2021 06:04) (97% - 98%)    ________________________________________________  PHYSICAL EXAM:  GENERAL: NAD  HEENT: Normocephalic;  conjunctivae and sclerae clear;  NECK : supple  CHEST/LUNG: Clear to auscultation bilaterally with good air entry   HEART: S1 S2  regular; no murmurs, gallops or rubs  ABDOMEN: Soft, Nontender, Nondistended; Bowel sounds present  EXTREMITIES: no cyanosis; no edema; no calf tenderness  SKIN: warm and dry; no rash  NERVOUS SYSTEM:  Awake and alert; Oriented  to place, person and time     _________________________________________________  LABS:                        10.1   6.92  )-----------( 342      ( 07 Jun 2021 07:05 )             29.1     06-07    142  |  108  |  13  ----------------------------<  80  3.7   |  26  |  0.76    Ca    8.6      07 Jun 2021 07:05  Phos  3.9     06-07  Mg     2.0     06-07      PT/INR - ( 06 Jun 2021 07:35 )   PT: 13.0 sec;   INR: 1.10 ratio         PTT - ( 06 Jun 2021 07:35 )  PTT:29.2 sec    CAPILLARY BLOOD GLUCOSE            RADIOLOGY & ADDITIONAL TESTS:   < from: CT Head No Cont (06.05.21 @ 08:41) >  IMPRESSION:  No acute intracranial hemorrhage or acute territorial infarct.  If symptoms persist, follow-up MRI exam recommended.    < end of copied text >    Imaging Personally Reviewed:  YES    Consultant(s) Notes Reviewed:   YES    Plan of care was discussed with patient and /or primary care giver; all questions and concerns were addressed and care was aligned with patient's wishes.

## 2021-06-07 NOTE — PROGRESS NOTE ADULT - ASSESSMENT
Patient is a 65 year old female from home whos only PMHx is Raynaud's phenomenon, who presents to the ED after a tonic clonic seziure. Admitted to medicine for new onset seizure work up.  CT head unremarkable for acute pathology,  EEG abnormal. Neurology consulted reccs: MRI and LP given low grade fevers. 
New onset seizure; new low grade fever; plan spinal tap consult ID for possible acyclovir await MRI

## 2021-06-07 NOTE — PROGRESS NOTE ADULT - PROBLEM SELECTOR PLAN 1
new onset seizure  CT head negative   UA and Utox negative   f/u MRI with epilepsy protocol; MRI screening form done and placed in chart   Abnormal EEG   neuro, Dr. Taylor following   seizure precautions

## 2021-06-08 ENCOUNTER — TRANSCRIPTION ENCOUNTER (OUTPATIENT)
Age: 66
End: 2021-06-08

## 2021-06-08 VITALS
RESPIRATION RATE: 18 BRPM | SYSTOLIC BLOOD PRESSURE: 103 MMHG | HEART RATE: 51 BPM | TEMPERATURE: 98 F | DIASTOLIC BLOOD PRESSURE: 63 MMHG | OXYGEN SATURATION: 97 %

## 2021-06-08 LAB
ANION GAP SERPL CALC-SCNC: 7 MMOL/L — SIGNIFICANT CHANGE UP (ref 5–17)
BUN SERPL-MCNC: 13 MG/DL — SIGNIFICANT CHANGE UP (ref 7–18)
CALCIUM SERPL-MCNC: 9 MG/DL — SIGNIFICANT CHANGE UP (ref 8.4–10.5)
CHLORIDE SERPL-SCNC: 108 MMOL/L — SIGNIFICANT CHANGE UP (ref 96–108)
CO2 SERPL-SCNC: 26 MMOL/L — SIGNIFICANT CHANGE UP (ref 22–31)
CREAT SERPL-MCNC: 0.67 MG/DL — SIGNIFICANT CHANGE UP (ref 0.5–1.3)
GLUCOSE SERPL-MCNC: 85 MG/DL — SIGNIFICANT CHANGE UP (ref 70–99)
HCT VFR BLD CALC: 30.8 % — LOW (ref 34.5–45)
HGB BLD-MCNC: 10.6 G/DL — LOW (ref 11.5–15.5)
MCHC RBC-ENTMCNC: 24.4 PG — LOW (ref 27–34)
MCHC RBC-ENTMCNC: 34.4 GM/DL — SIGNIFICANT CHANGE UP (ref 32–36)
MCV RBC AUTO: 71 FL — LOW (ref 80–100)
NRBC # BLD: 0 /100 WBCS — SIGNIFICANT CHANGE UP (ref 0–0)
PLATELET # BLD AUTO: 390 K/UL — SIGNIFICANT CHANGE UP (ref 150–400)
POTASSIUM SERPL-MCNC: 3.9 MMOL/L — SIGNIFICANT CHANGE UP (ref 3.5–5.3)
POTASSIUM SERPL-SCNC: 3.9 MMOL/L — SIGNIFICANT CHANGE UP (ref 3.5–5.3)
RBC # BLD: 4.34 M/UL — SIGNIFICANT CHANGE UP (ref 3.8–5.2)
RBC # FLD: 17.4 % — HIGH (ref 10.3–14.5)
SODIUM SERPL-SCNC: 141 MMOL/L — SIGNIFICANT CHANGE UP (ref 135–145)
WBC # BLD: 6.66 K/UL — SIGNIFICANT CHANGE UP (ref 3.8–10.5)
WBC # FLD AUTO: 6.66 K/UL — SIGNIFICANT CHANGE UP (ref 3.8–10.5)

## 2021-06-08 PROCEDURE — 83550 IRON BINDING TEST: CPT

## 2021-06-08 PROCEDURE — 85610 PROTHROMBIN TIME: CPT

## 2021-06-08 PROCEDURE — 80061 LIPID PANEL: CPT

## 2021-06-08 PROCEDURE — 87635 SARS-COV-2 COVID-19 AMP PRB: CPT

## 2021-06-08 PROCEDURE — 82607 VITAMIN B-12: CPT

## 2021-06-08 PROCEDURE — 99285 EMERGENCY DEPT VISIT HI MDM: CPT

## 2021-06-08 PROCEDURE — 83735 ASSAY OF MAGNESIUM: CPT

## 2021-06-08 PROCEDURE — 82728 ASSAY OF FERRITIN: CPT

## 2021-06-08 PROCEDURE — 84100 ASSAY OF PHOSPHORUS: CPT

## 2021-06-08 PROCEDURE — 87086 URINE CULTURE/COLONY COUNT: CPT

## 2021-06-08 PROCEDURE — 71045 X-RAY EXAM CHEST 1 VIEW: CPT

## 2021-06-08 PROCEDURE — 82746 ASSAY OF FOLIC ACID SERUM: CPT

## 2021-06-08 PROCEDURE — 83540 ASSAY OF IRON: CPT

## 2021-06-08 PROCEDURE — 70450 CT HEAD/BRAIN W/O DYE: CPT

## 2021-06-08 PROCEDURE — 82306 VITAMIN D 25 HYDROXY: CPT

## 2021-06-08 PROCEDURE — 95957 EEG DIGITAL ANALYSIS: CPT

## 2021-06-08 PROCEDURE — 86341 ISLET CELL ANTIBODY: CPT

## 2021-06-08 PROCEDURE — 80048 BASIC METABOLIC PNL TOTAL CA: CPT

## 2021-06-08 PROCEDURE — 83036 HEMOGLOBIN GLYCOSYLATED A1C: CPT

## 2021-06-08 PROCEDURE — 86769 SARS-COV-2 COVID-19 ANTIBODY: CPT

## 2021-06-08 PROCEDURE — 70553 MRI BRAIN STEM W/O & W/DYE: CPT

## 2021-06-08 PROCEDURE — 85730 THROMBOPLASTIN TIME PARTIAL: CPT

## 2021-06-08 PROCEDURE — 95819 EEG AWAKE AND ASLEEP: CPT

## 2021-06-08 PROCEDURE — 86803 HEPATITIS C AB TEST: CPT

## 2021-06-08 PROCEDURE — 85025 COMPLETE CBC W/AUTO DIFF WBC: CPT

## 2021-06-08 PROCEDURE — 86255 FLUORESCENT ANTIBODY SCREEN: CPT

## 2021-06-08 PROCEDURE — 36415 COLL VENOUS BLD VENIPUNCTURE: CPT

## 2021-06-08 PROCEDURE — 80307 DRUG TEST PRSMV CHEM ANLYZR: CPT

## 2021-06-08 PROCEDURE — 81001 URINALYSIS AUTO W/SCOPE: CPT

## 2021-06-08 PROCEDURE — 82962 GLUCOSE BLOOD TEST: CPT

## 2021-06-08 PROCEDURE — 93005 ELECTROCARDIOGRAM TRACING: CPT

## 2021-06-08 PROCEDURE — 80053 COMPREHEN METABOLIC PANEL: CPT

## 2021-06-08 PROCEDURE — 85027 COMPLETE CBC AUTOMATED: CPT

## 2021-06-08 RX ORDER — ACETAMINOPHEN 500 MG
2 TABLET ORAL
Qty: 0 | Refills: 0 | DISCHARGE
Start: 2021-06-08

## 2021-06-08 NOTE — DISCHARGE NOTE PROVIDER - NSDCCPCAREPLAN_GEN_ALL_CORE_FT
PRINCIPAL DISCHARGE DIAGNOSIS  Diagnosis: Seizure  Assessment and Plan of Treatment: You were admitted to the hospital after a siezure. All of your imaging was negative for acute pathology. You were seen and evaluated by a Neurologist who you should follow up with in the next week. Please call Dr Taylor's office to schedule an appointment as soon as possible.   [phone].   A seizure is a burst of electrical activity in your brain. A seizure may start in one part of your brain, or both sides may be affected. The seizure may last a few seconds or up to 5 minutes. A new-onset seizure is a seizure that happens for the first time. Some common triggers are alcohol, drugs, lack of sleep, fever, or an infection. High or low blood sugar levels, pregnancy, a head injury, or a stroke could also trigger a seizure. The cause of your seizure may not be known. You have a higher risk for another seizure within the next 2 years. What you can do to manage or prevent a seizure:  Manage stress. Stress can trigger a seizure. Exercise can help you reduce stress. Talk to your healthcare provider about exercise that is safe for you. Other ways to manage stress include yoga, meditation, and biofeedback. Illness can be a form of stress. Eat a variety of healthy foods and drink plenty of liquids during an illness.  Set a regular sleep schedule. A lack of sleep can trigger a seizure. Try to go to sleep and wake up at the same times every day. Keep your bedroom quiet and dark. Talk to your healthcare provider if you are having trouble sleeping.  Manage other medical conditions. Manage other health conditions that may increase your risk for a seizure. Keep your blood sugar levels and blood pressure under control.  Limit or do not drink alcohol as directed. Alcohol can trigger a seizure, especially if you drink a large amount at one time. A drink of alcohol is 12 ounces of beer, 1½ ounces of liquor, or 5 ounces of wine. Talk to your healthcare provider about a safe amount of alcohol for you. Your provider may recommend that you do not drink any alcohol. Tell him or her if you n

## 2021-06-08 NOTE — DISCHARGE NOTE PROVIDER - CARE PROVIDERS DIRECT ADDRESSES
[email or web address],DirectAddress_Unknown

## 2021-06-08 NOTE — DISCHARGE NOTE PROVIDER - NSDCMRMEDTOKEN_GEN_ALL_CORE_FT
acetaminophen 325 mg oral tablet: 2 tab(s) orally every 6 hours, As needed, Temp greater or equal to 38C (100.4F), Moderate Pain (4 - 6)

## 2021-06-08 NOTE — DISCHARGE NOTE NURSING/CASE MANAGEMENT/SOCIAL WORK - PATIENT PORTAL LINK FT
You can access the FollowMyHealth Patient Portal offered by [facility] by registering at the following website: [email or web address]. By joining [employer]’s FollowMyHealth portal, you will also be able to view your health information using other applications (apps) compatible with our system.

## 2021-06-08 NOTE — DISCHARGE NOTE PROVIDER - HOSPITAL COURSE
Patient is a 65 year old female from home whos only PMHx is Raynaud's phenomenon, presents to the ED after an episode tonic clonic siezure. Admitted to medicine for new onset seizure work up.  Both MRI and CT head unremarkable for acute pathology,  EEG abnormal. Neurology consulted, given the grossly normal imaging no need for lumbar puncture or antisiezure meds.   Given patient remained without siezure activity, and afebrile decision was made to discharge patient home with  outpatient follow up. \    Discharge discussed with attending     This is only a brief summary of patient's hospital stay, for full course please see EMR Patient is a 65 year old female from home whos only PMHx is Raynaud's phenomenon, presents to the ED after an episode tonic clonic siezure. Admitted to medicine for new onset seizure work up.  Both MRI and CT head unremarkable for acute pathology,  EEG abnormal. Neurology consulted, given the grossly normal imaging no need for lumbar puncture or antisiezure meds.   Given patient remained without siezure activity, and afebrile decision was made to discharge patient home with  outpatient follow up. \    Discharge discussed with attending     This is only a brief summary of patient's hospital stay, for full course please see EMR      -*-*-*-* INCOMPLETE 6/8 Patient is a 65 year old female from home whos only PMHx is Raynaud's phenomenon, presents to the ED after an episode tonic clonic siezure. Admitted to medicine for new onset seizure work up.  Both MRI and CT head unremarkable for acute pathology,  EEG abnormal. Neurology consulted, given the grossly normal imaging no need for lumbar puncture or antisiezure meds.   Given patient remained without siezure activity, and afebrile decision was made to discharge patient home with  outpatient follow up. No need for anticonvulsants. Wants to follow up with her own Neurologist.   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     Discharge discussed with attending     This is only a brief summary of patient's hospital stay, for full course please see EMR

## 2021-06-08 NOTE — DISCHARGE NOTE PROVIDER - CARE PROVIDER_API CALL
Yvonne Taylor)  Clinical Neurophysiology; Neurology  [address] [facility], [address]nd [place] [address]  Phone: [phone]  Fax: [phone]  Follow Up Time: 1-3 days    Flako Sood  Your PCP  Phone: (   )    -  Fax: (   )    -  Established Patient  Follow Up Time: 1 week   Yvonne Taylor)  Clinical Neurophysiology; Neurology  [address] [facility], [address]nd [place] [address]  Phone: [phone]  Fax: [phone]  Follow Up Time: 1-3 days    Stefan Falcon  NEUROLOGY  [address] [address]  Phone: [phone]  Fax: [phone]  Established Patient  Follow Up Time: 1-3 days    Flako Sood  Your PCP  Phone: (   )    -  Fax: (   )    -  Established Patient  Follow Up Time: 1 week

## 2021-06-08 NOTE — DISCHARGE NOTE PROVIDER - PROVIDER TOKENS
PROVIDER:[TOKEN:[[id number]],FOLLOWUP:[1-3 days]],FREE:[LAST:[Sood],FIRST:[Amadur],PHONE:[(   )    -],FAX:[(   )    -],ADDRESS:[Your PCP],FOLLOWUP:[1 week],ESTABLISHEDPATIENT:[T]] PROVIDER:[TOKEN:[[id number]],FOLLOWUP:[1-3 days]],PROVIDER:[TOKEN:[[id number]],FOLLOWUP:[1-3 days],ESTABLISHEDPATIENT:[T]],FREE:[LAST:[Sood],FIRST:[Amadur],PHONE:[(   )    -],FAX:[(   )    -],ADDRESS:[Your PCP],FOLLOWUP:[1 week],ESTABLISHEDPATIENT:[T]]

## 2021-06-11 PROBLEM — I73.00 RAYNAUD'S SYNDROME WITHOUT GANGRENE: Chronic | Status: ACTIVE | Noted: 2021-06-05

## 2021-06-14 LAB — PARANEOPLASTIC AB PNL SER: SIGNIFICANT CHANGE UP

## 2021-07-12 ENCOUNTER — APPOINTMENT (OUTPATIENT)
Dept: NEUROLOGY | Facility: CLINIC | Age: 66
End: 2021-07-12
Payer: MEDICARE

## 2021-07-12 VITALS
BODY MASS INDEX: 22.99 KG/M2 | TEMPERATURE: 97.2 F | OXYGEN SATURATION: 98 % | WEIGHT: 138 LBS | SYSTOLIC BLOOD PRESSURE: 123 MMHG | DIASTOLIC BLOOD PRESSURE: 77 MMHG | HEART RATE: 77 BPM | HEIGHT: 65 IN

## 2021-07-12 DIAGNOSIS — R56.9 UNSPECIFIED CONVULSIONS: ICD-10-CM

## 2021-07-12 PROCEDURE — 99214 OFFICE O/P EST MOD 30 MIN: CPT

## 2021-07-12 NOTE — DISCUSSION/SUMMARY
[FreeTextEntry1] : Exhibited images of MRI to show the lacunar infarcts reported; discussed the abnormal EEG and MRI as reasons to suspect recurrent seizures; discussed the statistical increase in the prevalence/incidence of seizures in the age-group above 60 years. Family history of strokes in aunts were also revealed. Does not wish to use anti-convulsants.\par Recommend postponement of anticonvulsant until after 72 hour ambulatory surgery

## 2021-07-12 NOTE — PHYSICAL EXAM
[Oriented To Time, Place, And Person] : oriented to person, place, and time [Impaired Insight] : insight and judgment were intact [Affect] : the affect was normal [Person] : oriented to person [Place] : oriented to place [Time] : oriented to time [Concentration Intact] : normal concentrating ability [Visual Intact] : visual attention was ~T not ~L decreased [Naming Objects] : no difficulty naming common objects [Repeating Phrases] : no difficulty repeating a phrase [Writing A Sentence] : no difficulty writing a sentence [Fluency] : fluency intact [Comprehension] : comprehension intact [Reading] : reading intact [Past History] : adequate knowledge of personal past history [Cranial Nerves Optic (II)] : visual acuity intact bilaterally,  visual fields full to confrontation, pupils equal round and reactive to light [Cranial Nerves Oculomotor (III)] : extraocular motion intact [Cranial Nerves Trigeminal (V)] : facial sensation intact symmetrically [Cranial Nerves Facial (VII)] : face symmetrical [Cranial Nerves Vestibulocochlear (VIII)] : hearing was intact bilaterally [Cranial Nerves Glossopharyngeal (IX)] : tongue and palate midline [Cranial Nerves Accessory (XI - Cranial And Spinal)] : head turning and shoulder shrug symmetric [Cranial Nerves Hypoglossal (XII)] : there was no tongue deviation with protrusion [Motor Tone] : muscle tone was normal in all four extremities [Motor Strength] : muscle strength was normal in all four extremities [No Muscle Atrophy] : normal bulk in all four extremities [Sensation Tactile Decrease] : light touch was intact [Abnormal Walk] : normal gait [Balance] : balance was intact [2+] : Ankle jerk left 2+ [Past-pointing] : there was no past-pointing [Tremor] : no tremor present [Plantar Reflex Right Only] : normal on the right [Plantar Reflex Left Only] : normal on the left

## 2021-08-10 ENCOUNTER — NON-APPOINTMENT (OUTPATIENT)
Age: 66
End: 2021-08-10

## 2021-08-17 ENCOUNTER — APPOINTMENT (OUTPATIENT)
Dept: NEUROLOGY | Facility: CLINIC | Age: 66
End: 2021-08-17
Payer: MEDICARE

## 2021-08-17 PROCEDURE — 95816 EEG AWAKE AND DROWSY: CPT

## 2021-08-18 PROCEDURE — 95708 EEG WO VID EA 12-26HR UNMNTR: CPT

## 2021-08-19 PROCEDURE — 95708 EEG WO VID EA 12-26HR UNMNTR: CPT

## 2021-08-20 PROCEDURE — 95723 EEG PHY/QHP>60<84 HR W/O VID: CPT

## 2021-08-20 PROCEDURE — 95700 EEG CONT REC W/VID EEG TECH: CPT

## 2021-08-20 PROCEDURE — 95708 EEG WO VID EA 12-26HR UNMNTR: CPT

## 2022-03-01 NOTE — ED ADULT NURSE NOTE - EXTENSIONS OF SELF_ADULT
None Graft Cartilage Fenestration Text: The cartilage was fenestrated with a 2mm punch biopsy to help facilitate graft survival and healing.

## 2022-04-04 ENCOUNTER — EMERGENCY (EMERGENCY)
Facility: HOSPITAL | Age: 67
LOS: 1 days | Discharge: HOME | End: 2022-04-04
Attending: EMERGENCY MEDICINE
Payer: MEDICARE

## 2022-04-04 VITALS
RESPIRATION RATE: 17 BRPM | HEART RATE: 73 BPM | SYSTOLIC BLOOD PRESSURE: 114 MMHG | WEIGHT: 136.03 LBS | OXYGEN SATURATION: 100 % | TEMPERATURE: 98 F | HEIGHT: 65 IN | DIASTOLIC BLOOD PRESSURE: 68 MMHG

## 2022-04-04 VITALS
SYSTOLIC BLOOD PRESSURE: 99 MMHG | OXYGEN SATURATION: 98 % | DIASTOLIC BLOOD PRESSURE: 61 MMHG | HEART RATE: 52 BPM | TEMPERATURE: 99 F | RESPIRATION RATE: 18 BRPM

## 2022-04-04 DIAGNOSIS — Z98.89 OTHER SPECIFIED POSTPROCEDURAL STATES: Chronic | ICD-10-CM

## 2022-04-04 LAB
ALBUMIN SERPL ELPH-MCNC: 4.2 G/DL — SIGNIFICANT CHANGE UP (ref 3.5–5)
ALP SERPL-CCNC: 71 U/L — SIGNIFICANT CHANGE UP (ref 40–120)
ALT FLD-CCNC: 25 U/L DA — SIGNIFICANT CHANGE UP (ref 10–60)
ANION GAP SERPL CALC-SCNC: 5 MMOL/L — SIGNIFICANT CHANGE UP (ref 5–17)
APPEARANCE UR: CLEAR — SIGNIFICANT CHANGE UP
AST SERPL-CCNC: 32 U/L — SIGNIFICANT CHANGE UP (ref 10–40)
BACTERIA # UR AUTO: ABNORMAL /HPF
BASOPHILS # BLD AUTO: 0.04 K/UL — SIGNIFICANT CHANGE UP (ref 0–0.2)
BASOPHILS NFR BLD AUTO: 0.6 % — SIGNIFICANT CHANGE UP (ref 0–2)
BILIRUB SERPL-MCNC: 0.2 MG/DL — SIGNIFICANT CHANGE UP (ref 0.2–1.2)
BILIRUB UR-MCNC: NEGATIVE — SIGNIFICANT CHANGE UP
BUN SERPL-MCNC: 15 MG/DL — SIGNIFICANT CHANGE UP (ref 7–18)
CALCIUM SERPL-MCNC: 9 MG/DL — SIGNIFICANT CHANGE UP (ref 8.4–10.5)
CHLORIDE SERPL-SCNC: 110 MMOL/L — HIGH (ref 96–108)
CHOLEST SERPL-MCNC: 257 MG/DL — HIGH
CO2 SERPL-SCNC: 25 MMOL/L — SIGNIFICANT CHANGE UP (ref 22–31)
COLOR SPEC: YELLOW — SIGNIFICANT CHANGE UP
CREAT SERPL-MCNC: 0.76 MG/DL — SIGNIFICANT CHANGE UP (ref 0.5–1.3)
DIFF PNL FLD: NEGATIVE — SIGNIFICANT CHANGE UP
EGFR: 87 ML/MIN/1.73M2 — SIGNIFICANT CHANGE UP
EOSINOPHIL # BLD AUTO: 0.14 K/UL — SIGNIFICANT CHANGE UP (ref 0–0.5)
EOSINOPHIL NFR BLD AUTO: 2 % — SIGNIFICANT CHANGE UP (ref 0–6)
EPI CELLS # UR: SIGNIFICANT CHANGE UP /HPF
GLUCOSE SERPL-MCNC: 90 MG/DL — SIGNIFICANT CHANGE UP (ref 70–99)
GLUCOSE UR QL: NEGATIVE — SIGNIFICANT CHANGE UP
HCT VFR BLD CALC: 35 % — SIGNIFICANT CHANGE UP (ref 34.5–45)
HDLC SERPL-MCNC: 121 MG/DL — SIGNIFICANT CHANGE UP
HGB BLD-MCNC: 11.6 G/DL — SIGNIFICANT CHANGE UP (ref 11.5–15.5)
IMM GRANULOCYTES NFR BLD AUTO: 0.3 % — SIGNIFICANT CHANGE UP (ref 0–1.5)
KETONES UR-MCNC: NEGATIVE — SIGNIFICANT CHANGE UP
LEUKOCYTE ESTERASE UR-ACNC: NEGATIVE — SIGNIFICANT CHANGE UP
LIPID PNL WITH DIRECT LDL SERPL: 112 MG/DL — HIGH
LYMPHOCYTES # BLD AUTO: 3.05 K/UL — SIGNIFICANT CHANGE UP (ref 1–3.3)
LYMPHOCYTES # BLD AUTO: 43 % — SIGNIFICANT CHANGE UP (ref 13–44)
MCHC RBC-ENTMCNC: 20.9 PG — LOW (ref 27–34)
MCHC RBC-ENTMCNC: 33.1 GM/DL — SIGNIFICANT CHANGE UP (ref 32–36)
MCV RBC AUTO: 63.2 FL — LOW (ref 80–100)
MONOCYTES # BLD AUTO: 0.47 K/UL — SIGNIFICANT CHANGE UP (ref 0–0.9)
MONOCYTES NFR BLD AUTO: 6.6 % — SIGNIFICANT CHANGE UP (ref 2–14)
NEUTROPHILS # BLD AUTO: 3.37 K/UL — SIGNIFICANT CHANGE UP (ref 1.8–7.4)
NEUTROPHILS NFR BLD AUTO: 47.5 % — SIGNIFICANT CHANGE UP (ref 43–77)
NITRITE UR-MCNC: NEGATIVE — SIGNIFICANT CHANGE UP
NON HDL CHOLESTEROL: 136 MG/DL — HIGH
NRBC # BLD: 0 /100 WBCS — SIGNIFICANT CHANGE UP (ref 0–0)
PH UR: 6 — SIGNIFICANT CHANGE UP (ref 5–8)
PLATELET # BLD AUTO: 414 K/UL — HIGH (ref 150–400)
POTASSIUM SERPL-MCNC: 3.8 MMOL/L — SIGNIFICANT CHANGE UP (ref 3.5–5.3)
POTASSIUM SERPL-SCNC: 3.8 MMOL/L — SIGNIFICANT CHANGE UP (ref 3.5–5.3)
PROT SERPL-MCNC: 8.6 G/DL — HIGH (ref 6–8.3)
PROT UR-MCNC: 15
RBC # BLD: 5.54 M/UL — HIGH (ref 3.8–5.2)
RBC # FLD: 20 % — HIGH (ref 10.3–14.5)
RBC CASTS # UR COMP ASSIST: SIGNIFICANT CHANGE UP /HPF (ref 0–2)
SARS-COV-2 RNA SPEC QL NAA+PROBE: SIGNIFICANT CHANGE UP
SODIUM SERPL-SCNC: 140 MMOL/L — SIGNIFICANT CHANGE UP (ref 135–145)
SP GR SPEC: 1.01 — SIGNIFICANT CHANGE UP (ref 1.01–1.02)
TRIGL SERPL-MCNC: 118 MG/DL — SIGNIFICANT CHANGE UP
TSH SERPL-MCNC: 0.54 UU/ML — SIGNIFICANT CHANGE UP (ref 0.34–4.82)
UROBILINOGEN FLD QL: NEGATIVE — SIGNIFICANT CHANGE UP
WBC # BLD: 7.09 K/UL — SIGNIFICANT CHANGE UP (ref 3.8–10.5)
WBC # FLD AUTO: 7.09 K/UL — SIGNIFICANT CHANGE UP (ref 3.8–10.5)
WBC UR QL: SIGNIFICANT CHANGE UP /HPF (ref 0–5)

## 2022-04-04 PROCEDURE — 80053 COMPREHEN METABOLIC PANEL: CPT

## 2022-04-04 PROCEDURE — 80061 LIPID PANEL: CPT

## 2022-04-04 PROCEDURE — 96374 THER/PROPH/DIAG INJ IV PUSH: CPT

## 2022-04-04 PROCEDURE — 81001 URINALYSIS AUTO W/SCOPE: CPT

## 2022-04-04 PROCEDURE — 87635 SARS-COV-2 COVID-19 AMP PRB: CPT

## 2022-04-04 PROCEDURE — 99284 EMERGENCY DEPT VISIT MOD MDM: CPT

## 2022-04-04 PROCEDURE — 84443 ASSAY THYROID STIM HORMONE: CPT

## 2022-04-04 PROCEDURE — 70450 CT HEAD/BRAIN W/O DYE: CPT | Mod: MA

## 2022-04-04 PROCEDURE — 93005 ELECTROCARDIOGRAM TRACING: CPT

## 2022-04-04 PROCEDURE — 99285 EMERGENCY DEPT VISIT HI MDM: CPT | Mod: 25

## 2022-04-04 PROCEDURE — 70450 CT HEAD/BRAIN W/O DYE: CPT | Mod: 26,MA

## 2022-04-04 PROCEDURE — 82962 GLUCOSE BLOOD TEST: CPT

## 2022-04-04 PROCEDURE — 93010 ELECTROCARDIOGRAM REPORT: CPT

## 2022-04-04 PROCEDURE — 85025 COMPLETE CBC W/AUTO DIFF WBC: CPT

## 2022-04-04 PROCEDURE — 36415 COLL VENOUS BLD VENIPUNCTURE: CPT

## 2022-04-04 RX ORDER — LEVETIRACETAM 250 MG/1
1000 TABLET, FILM COATED ORAL ONCE
Refills: 0 | Status: COMPLETED | OUTPATIENT
Start: 2022-04-04 | End: 2022-04-04

## 2022-04-04 RX ORDER — LEVETIRACETAM 250 MG/1
1 TABLET, FILM COATED ORAL
Qty: 60 | Refills: 0
Start: 2022-04-04 | End: 2022-05-03

## 2022-04-04 RX ADMIN — LEVETIRACETAM 400 MILLIGRAM(S): 250 TABLET, FILM COATED ORAL at 17:08

## 2022-04-04 NOTE — ED PROVIDER NOTE - CARE PROVIDER_API CALL
Yvonne Taylor (MD)  Clinical Neurophysiology; Neurology; Sleep Medicine  [address] [facility], [address] [place] [address]  Phone: [phone]  Fax: [phone]  Follow Up Time:

## 2022-04-04 NOTE — ED ADULT NURSE NOTE - NSIMPLEMENTINTERV_GEN_ALL_ED
Implemented All Fall with Harm Risk Interventions:  [place] to call system. Call bell, personal items and telephone within reach. Instruct patient to call for assistance. Room bathroom lighting operational. Non-slip footwear when patient is off stretcher. Physically safe environment: no spills, clutter or unnecessary equipment. Stretcher in lowest position, wheels locked, appropriate side rails in place. Provide visual cue, wrist band, yellow gown, etc. Monitor gait and stability. Monitor for mental status changes and reorient to person, place, and time. Review medications for side effects contributing to fall risk. Reinforce activity limits and safety measures with patient and family. Provide visual clues: red socks.

## 2022-04-04 NOTE — ED PROVIDER NOTE - PATIENT PORTAL LINK FT
You can access the FollowMyHealth Patient Portal offered by [facility] by registering at the following website: [email or web address]. By joining [employer]’s FollowMyHealth portal, you will also be able to view your health information using other applications (apps) compatible with our system.

## 2022-04-04 NOTE — ED PROVIDER NOTE - CLINICAL SUMMARY MEDICAL DECISION MAKING FREE TEXT BOX
65 y/o female w/ second episode of seizure. Currently at baseline w/ normal neuro exam. Will perform CT imaging of head, labs, and consult neurology on need for seizure prophylaxis.

## 2022-04-04 NOTE — ED PROVIDER NOTE - NSFOLLOWUPINSTRUCTIONS_ED_ALL_ED_FT
Seizure, Adult      A seizure is a sudden burst of abnormal electrical and chemical activity in the brain. Seizures usually last from 30 seconds to 2 minutes. The abnormal activity temporarily interrupts normal brain function.    Many types of seizures can affect adults. A seizure can cause many different symptoms depending on where in the brain it starts.      What are the causes?    Common causes of this condition include:  •Fever or infection.      •Brain injury, head trauma, bleeding in the brain, or a brain tumor.      •Low levels of blood sugar or salt (sodium).      •Kidney problems or liver problems.      •Metabolic disorders or other conditions that are passed from parent to child (are inherited).      •Reaction to a substance, such as a drug or a medicine, or suddenly stopping the use of a substance (withdrawal).      •A stroke.      •Developmental disorders such as autism spectrum disorder or cerebral palsy.      In some cases, the cause of a seizure may not be known. Some people who have a seizure never have another one. A person who has repeated seizures over time without a clear cause has a condition called epilepsy.      What increases the risk?    You are more likely to develop this condition if:  •You have a family history of epilepsy.      •You have had a tonic–clonic seizure before. This type of seizure causes tightening (contraction) of the muscles of the whole body and loss of consciousness.      •You have a history of head trauma, lack of oxygen at birth, or strokes.        What are the signs or symptoms?    There are many different types of seizures. The symptoms vary depending on the type of seizure you have. Symptoms occur during the seizure. They may also occur before a seizure (aura) and after a seizure (postictal). Symptoms may include the following:    Symptoms during a seizure     •Uncontrollable shaking (convulsions) with fast, jerky movements of muscles.      •Stiffening of the body.      •Breathing problems.      •Confusion, staring, or unresponsiveness.      •Head nodding, eye blinking or fluttering, or rapid eye movements.      •Drooling, grunting, or making clicking sounds with your mouth.      •Loss of bladder control and bowel control.      Symptoms before a seizure     •Fear or anxiety.      •Nausea.    •Vertigo. This is a feeling like:  •You are moving when you are not.      •Your surroundings are moving when they are not.        •Déjà vu. This is a feeling of having seen or heard something before.      •Odd tastes or smells.      •Changes in vision, such as seeing flashing lights or spots.      Symptoms after a seizure     •Confusion.      •Sleepiness.      •Headache.      •Sore muscles.        How is this diagnosed?    This condition may be diagnosed based on:  •A description of your symptoms. Video of your seizures can be helpful.      •Your medical history.      •A physical exam.      You may also have tests, including:  •Blood tests.      •CT scan.      •MRI.      •Electroencephalogram (EEG). This test measures electrical activity in the brain. An EEG can predict whether seizures will return.      •A spinal tap, also called a lumbar puncture. This is the removal and testing of fluid that surrounds the brain and spinal cord.        How is this treated?    Most seizures will stop on their own in less than 5 minutes, and no treatment is needed. Seizures that last longer than 5 minutes will usually need treatment.    Seizures may be treated with:  •Medicines given through an IV.      •Avoiding known triggers, such as medicines that you take for another condition.      •Medicines to control seizures or prevent future seizures (antiepileptics), if epilepsy caused your seizures.      •Medical devices to prevent and control seizures.      •Surgery to stop seizures or to reduce how often seizures happen, if you have epilepsy that does not respond to medicines.      •A diet low in carbohydrates and high in fat (ketogenic diet).        Follow these instructions at home:    Medicines     •Take over-the-counter and prescription medicines only as told by your health care provider.      •Avoid any substances that may prevent your medicine from working properly, such as alcohol.      Activity     •Follow instructions about activities, such as driving or swimming, that would be dangerous if you had another seizure. Wait until your health care provider says it is safe to do them.      •If you live in the [place] check with your local department of motor vehicles (DMV) to find out about local driving laws. Each state has specific rules about when you can legally drive again.      •Get enough rest. Lack of sleep can make seizures more likely to occur.        Educating others    •Teach friends and family what to do if you have a seizure. They should:  •Help you get down to the ground, to prevent a fall.      •Cushion your head and move items away from your body.      •Loosen any tight clothing around your neck.      •Turn you on your side. If you vomit, this helps keep your airway clear.      •Know whether or not you need emergency care.      •Stay with you until you recover.      •Also, tell them what not to do if you have a seizure. Tell them:  •They should not hold you down. Holding you down will not stop the seizure.      •They should not put anything in your mouth.        General instructions     •Avoid anything that has ever triggered a seizure for you.      •Keep a seizure diary. Record what you remember about each seizure, especially anything that might have triggered it.      •Keep all follow-up visits. This is important.        Contact a health care provider if:    •You have another seizure or seizures. Call each time you have a seizure.      •Your seizure pattern changes.      •You continue to have seizures with treatment.      •You have symptoms of an infection or illness. Either of these might increase your risk of having a seizure.      •You are unable to take your medicine.        Get help right away if:  •You have:  •A seizure that does not stop after 5 minutes.      •Several seizures in a row without a complete recovery between seizures.      •A seizure that makes it harder to breathe.      •A seizure that leaves you unable to speak or use a part of your body.        •You do not wake up right away after a seizure.      •You injure yourself during a seizure.      •You have confusion or pain right after a seizure.      These symptoms may represent a serious problem that is an emergency. Do not wait to see if the symptoms will go away. Get medical help right away. Call your local emergency services (911 in the [place] Do not drive yourself to the hospital.       Summary    •Seizures are caused by abnormal electrical and chemical activity in the brain. The activity disrupts normal brain function and can cause various symptoms.      •Seizures have many causes, including illness, head injuries, low levels of blood sugar or salt, and certain conditions.      •Most seizures will stop on their own in less than 5 minutes. Seizures that last longer than 5 minutes are a medical emergency and need treatment right away.      •Many medicines are used to treat seizures. Take over-the-counter and prescription medicines only as told by your health care provider.      This information is not intended to replace advice given to you by your health care provider. Make sure you discuss any questions you have with your health care provider.

## 2022-04-04 NOTE — ED PROVIDER NOTE - PROGRESS NOTE DETAILS
D/w Dr. Taylor who took pt to arrange f/u this week. Recommended Keppra outpatient. Educated pt who agreed to outpatient treatment and f/u.

## 2022-04-04 NOTE — ED PROVIDER NOTE - OBJECTIVE STATEMENT
65 y/o female, history of seizure last year, presents w/ second episode today around 5:00 AM. Generalized seizure witnessed by  while lying down this morning. Biting of the lip was postictal for around 15 minutes. Currently back to baseline w/ no complaints. States that her initial seizure last year was suspected due to stroke. Has not been on outpatient medication. No fever, vomiting, diarrhea, or headache. No known drug allergies.

## 2022-04-04 NOTE — ED ADULT TRIAGE NOTE - HEIGHT IN FEET
Exploration of left globe with repair of conjunctival laceration    Marva Tariq  3/23/2017    Pre-op Diagnosis:   Left eye trauma with concern for ruptured globe    Post-op Diagnosis:     Left eye trauma with conjunctival laceration    Procedure/CPT® Codes:  Exploration of left globe wit repair of conjunctival laceration    Procedure(s):  RIGHT EXPLORATION OF EYE, REPAIR OF LACERATION    Surgeon(s):  MD Kayden Hernandez MD    Anesthesia: General    Staff:   Circulator: Warren Herrera RN  Scrub Person: Joan Boss  Nursing Assistant: Arthur Sims    Estimated Blood Loss: Minimal                  Findings: Nasal conjunctival laceration, left eye    Complications: None      Kayden Sherman MD     Date: 3/23/2017  Time: 9:37 PM      
5

## 2022-04-04 NOTE — ED ADULT NURSE NOTE - OBJECTIVE STATEMENT
Pt AOx4, ambulatory, c/o seizure episode this morning around 0900 witnessed by . Symptoms improved on ED arrival. EKG done, pt placed on cardiac monitor, no signs of distress noted.

## 2022-04-15 ENCOUNTER — APPOINTMENT (OUTPATIENT)
Dept: NEUROLOGY | Facility: CLINIC | Age: 67
End: 2022-04-15
Payer: MEDICARE

## 2022-04-15 VITALS
TEMPERATURE: 97.5 F | BODY MASS INDEX: 22.82 KG/M2 | HEART RATE: 62 BPM | WEIGHT: 137 LBS | SYSTOLIC BLOOD PRESSURE: 127 MMHG | HEIGHT: 65 IN | OXYGEN SATURATION: 98 % | DIASTOLIC BLOOD PRESSURE: 71 MMHG

## 2022-04-15 PROCEDURE — 99214 OFFICE O/P EST MOD 30 MIN: CPT

## 2022-04-15 NOTE — PHYSICAL EXAM
[Person] : oriented to person [Place] : oriented to place [Time] : oriented to time [Concentration Intact] : normal concentrating ability [Visual Intact] : visual attention was ~T not ~L decreased [Naming Objects] : no difficulty naming common objects [Repeating Phrases] : no difficulty repeating a phrase [Writing A Sentence] : no difficulty writing a sentence [Fluency] : fluency intact [Comprehension] : comprehension intact [Reading] : reading intact [Past History] : adequate knowledge of personal past history [Cranial Nerves Optic (II)] : visual acuity intact bilaterally,  visual fields full to confrontation, pupils equal round and reactive to light [Cranial Nerves Oculomotor (III)] : extraocular motion intact [Cranial Nerves Trigeminal (V)] : facial sensation intact symmetrically [Cranial Nerves Facial (VII)] : face symmetrical [Cranial Nerves Glossopharyngeal (IX)] : tongue and palate midline [Cranial Nerves Vestibulocochlear (VIII)] : hearing was intact bilaterally [Cranial Nerves Accessory (XI - Cranial And Spinal)] : head turning and shoulder shrug symmetric [Cranial Nerves Hypoglossal (XII)] : there was no tongue deviation with protrusion [Motor Tone] : muscle tone was normal in all four extremities [Motor Strength] : muscle strength was normal in all four extremities [No Muscle Atrophy] : normal bulk in all four extremities [Sensation Tactile Decrease] : light touch was intact [Abnormal Walk] : normal gait [Balance] : balance was intact [2+] : Ankle jerk left 2+ [Sclera] : the sclera and conjunctiva were normal [PERRL With Normal Accommodation] : pupils were equal in size, round, reactive to light, with normal accommodation [Extraocular Movements] : extraocular movements were intact [Past-pointing] : there was no past-pointing [Tremor] : no tremor present [Plantar Reflex Right Only] : normal on the right [Plantar Reflex Left Only] : normal on the left

## 2022-04-15 NOTE — HISTORY OF PRESENT ILLNESS
[FreeTextEntry1] : Seizure on 4/4 went to the hospital and started on levetiracetam; On 4/13 she took a nap and awoke groggy. Falls asleep at 8 pm and awakens early in the morning and other body rhythms appear disrupted.

## 2022-04-15 NOTE — DATA REVIEWED
[de-identified] :  ACC: [id number] EXAM: CT BRAIN  PROCEDURE DATE: 04/04/2022    INTERPRETATION: INDICATIONS: seizure .  TECHNIQUE: Serial axial images were obtained from the skull base to the vertex without intravenous contrast. Coronal and sagittal reformatted images were obtained.  COMPARISON EXAMINATION: 6/5/2021 CT and 6/7/2021 MR  FINDINGS: VENTRICLES AND SULCI: Normal. INTRA-AXIAL: No mass, blood or abnormal attenuation is seen. EXTRA-AXIAL: No mass or collection is seen. VISUALIZED SINUSES: Clear. VISUALIZED MASTOIDS: Clear. CALVARIUM: Normal. MISCELLANEOUS: None.  IMPRESSION: Normal non-contrast CT of the brain.  --- End of Report ---      NICOLE ANTHONY MD; Attending Radiologist This document has been electronically signed. Apr 4 2022 5:13PM

## 2022-04-15 NOTE — DISCUSSION/SUMMARY
[FreeTextEntry1] : Check levetiracetam level and return for follow up after 2 months Repeat MRI and 272 h EEG if symptoms don’t improve

## 2022-04-16 PROBLEM — R56.9 UNSPECIFIED CONVULSIONS: Chronic | Status: ACTIVE | Noted: 2022-04-04

## 2022-04-22 LAB — LEVETIRACETAM SERPL-MCNC: 15.8 UG/ML

## 2022-05-02 ENCOUNTER — TRANSCRIPTION ENCOUNTER (OUTPATIENT)
Age: 67
End: 2022-05-02

## 2022-07-12 ENCOUNTER — APPOINTMENT (OUTPATIENT)
Dept: NEUROLOGY | Facility: CLINIC | Age: 67
End: 2022-07-12

## 2022-07-12 VITALS
TEMPERATURE: 97 F | HEART RATE: 64 BPM | BODY MASS INDEX: 22.66 KG/M2 | OXYGEN SATURATION: 99 % | HEIGHT: 65 IN | WEIGHT: 136 LBS | DIASTOLIC BLOOD PRESSURE: 61 MMHG | SYSTOLIC BLOOD PRESSURE: 119 MMHG

## 2022-07-12 DIAGNOSIS — I63.81 OTHER CEREBRAL INFARCTION DUE TO OCCLUSION OR STENOSIS OF SMALL ARTERY: ICD-10-CM

## 2022-07-12 DIAGNOSIS — L65.9 NONSCARRING HAIR LOSS, UNSPECIFIED: ICD-10-CM

## 2022-07-12 PROCEDURE — 99215 OFFICE O/P EST HI 40 MIN: CPT

## 2022-07-12 RX ORDER — LEVETIRACETAM 750 MG/1
750 TABLET, FILM COATED ORAL TWICE DAILY
Qty: 180 | Refills: 3 | Status: DISCONTINUED | COMMUNITY
Start: 2022-05-02 | End: 2022-07-12

## 2022-07-12 NOTE — HISTORY OF PRESENT ILLNESS
[FreeTextEntry1] : No generalized seizures but  has noted episodes when she falls asleep , during the day, for an hour or more, uncharacteristically, and awakens with memory loss for events in the immediate past that she then begins to recall after prompting over the next few hours.

## 2022-07-12 NOTE — DISCUSSION/SUMMARY
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 Plan MRI to investigate slow growing gliomas / new stroke(previous MRI > 1 year ago reviewed - no mass) increase levetiracetam and re-check level; maintain a diary of episodes on new, higher dose of levetiracetam,  for follow up \par Previous MRI showed small bilateral basal ganglia infarcts: recommend aspirin 81 mg a day, check fasting lipid profile and monitor BP at home\par Minoxidil for treatment of levetiracetam induced hair loss.

## 2022-07-13 ENCOUNTER — LABORATORY RESULT (OUTPATIENT)
Age: 67
End: 2022-07-13

## 2022-07-30 ENCOUNTER — APPOINTMENT (OUTPATIENT)
Dept: MRI IMAGING | Facility: CLINIC | Age: 67
End: 2022-07-30

## 2022-07-30 ENCOUNTER — OUTPATIENT (OUTPATIENT)
Dept: OUTPATIENT SERVICES | Facility: HOSPITAL | Age: 67
LOS: 1 days | End: 2022-07-30
Payer: MEDICARE

## 2022-07-30 DIAGNOSIS — R56.9 UNSPECIFIED CONVULSIONS: ICD-10-CM

## 2022-07-30 DIAGNOSIS — Z98.89 OTHER SPECIFIED POSTPROCEDURAL STATES: Chronic | ICD-10-CM

## 2022-07-30 PROCEDURE — 76377 3D RENDER W/INTRP POSTPROCES: CPT

## 2022-07-30 PROCEDURE — 70553 MRI BRAIN STEM W/O & W/DYE: CPT

## 2022-07-30 PROCEDURE — A9585: CPT

## 2022-07-30 PROCEDURE — 70553 MRI BRAIN STEM W/O & W/DYE: CPT | Mod: 26,MH

## 2022-08-04 ENCOUNTER — LABORATORY RESULT (OUTPATIENT)
Age: 67
End: 2022-08-04

## 2022-08-12 ENCOUNTER — APPOINTMENT (OUTPATIENT)
Dept: NEUROLOGY | Facility: CLINIC | Age: 67
End: 2022-08-12

## 2022-08-12 VITALS
OXYGEN SATURATION: 99 % | SYSTOLIC BLOOD PRESSURE: 139 MMHG | DIASTOLIC BLOOD PRESSURE: 69 MMHG | HEART RATE: 52 BPM | HEIGHT: 65 IN | TEMPERATURE: 97.2 F

## 2022-08-12 PROCEDURE — 99215 OFFICE O/P EST HI 40 MIN: CPT

## 2022-08-12 NOTE — PHYSICAL EXAM
[Past-pointing] : there was no past-pointing [Tremor] : no tremor present [Plantar Reflex Right Only] : normal on the right [Plantar Reflex Left Only] : normal on the left [Person] : oriented to person [Place] : oriented to place [Time] : oriented to time [Concentration Intact] : normal concentrating ability [Visual Intact] : visual attention was ~T not ~L decreased [Naming Objects] : no difficulty naming common objects [Repeating Phrases] : no difficulty repeating a phrase [Writing A Sentence] : no difficulty writing a sentence [Fluency] : fluency intact [Comprehension] : comprehension intact [Reading] : reading intact [Past History] : adequate knowledge of personal past history [Cranial Nerves Optic (II)] : visual acuity intact bilaterally,  visual fields full to confrontation, pupils equal round and reactive to light [Cranial Nerves Oculomotor (III)] : extraocular motion intact [Cranial Nerves Trigeminal (V)] : facial sensation intact symmetrically [Cranial Nerves Facial (VII)] : face symmetrical [Cranial Nerves Vestibulocochlear (VIII)] : hearing was intact bilaterally [Cranial Nerves Glossopharyngeal (IX)] : tongue and palate midline [Cranial Nerves Accessory (XI - Cranial And Spinal)] : head turning and shoulder shrug symmetric [Cranial Nerves Hypoglossal (XII)] : there was no tongue deviation with protrusion [Motor Tone] : muscle tone was normal in all four extremities [Motor Strength] : muscle strength was normal in all four extremities [No Muscle Atrophy] : normal bulk in all four extremities [Sensation Tactile Decrease] : light touch was intact [Abnormal Walk] : normal gait [Balance] : balance was intact [2+] : Ankle jerk left 2+ [Sclera] : the sclera and conjunctiva were normal [PERRL With Normal Accommodation] : pupils were equal in size, round, reactive to light, with normal accommodation [Extraocular Movements] : extraocular movements were intact [Outer Ear] : the ears and nose were normal in appearance [Oropharynx] : the oropharynx was normal [Neck Appearance] : the appearance of the neck was normal [Neck Cervical Mass (___cm)] : no neck mass was observed [Jugular Venous Distention Increased] : there was no jugular-venous distention [Thyroid Diffuse Enlargement] : the thyroid was not enlarged [Thyroid Nodule] : there were no palpable thyroid nodules [] : no respiratory distress [Auscultation Breath Sounds / Voice Sounds] : lungs were clear to auscultation bilaterally [Heart Rate And Rhythm] : heart rate was normal and rhythm regular [Heart Sounds] : normal S1 and S2 [Heart Sounds Gallop] : no gallops [Murmurs] : no murmurs [Heart Sounds Pericardial Friction Rub] : no pericardial rub

## 2022-08-12 NOTE — HISTORY OF PRESENT ILLNESS
[FreeTextEntry1] : No generalized seizures; previously noted episodes have not recurred since beginning the higher dose (when she falls asleep , during the day, for an hour or more, uncharacteristically, and awakens with memory loss for events in the immediate past that she then begins to recall after prompting over the next few hours). But , she brought a picture of a bottle of apple cider vinegar left open on the countertop with the top from a different container over his mouth and had no memory of doing this.  She usually opens a bottle of apple cider vinegar in the morning when she wakes up but she found this when she went to find the apple cider vinegar, one  morning when she awoke.\par \par She also notes difficulty falling asleep or staying asleep.  Last night was one of the worst nights when she fell asleep at her usual time of 9 PM and awoke at 11 AM and could not sleep until it was time to get up from bed at 6 AM in the morning.  Her  notes, however, that she has always been a poor sleeper all the time he has been  to her

## 2022-08-12 NOTE — REVIEW OF SYSTEMS
[Facial Weakness] : no facial weakness [Arm Weakness] : no arm weakness [Hand Weakness] : no hand weakness [Leg Weakness] : no leg weakness [Poor Coordination] : good coordination [Numbness] : no numbness [Tingling] : no tingling [Abnormal Sensation] : no abnormal sensation

## 2022-08-12 NOTE — DISCUSSION/SUMMARY
[FreeTextEntry1] : We discussed the normal MRI scan of the brain.\par \par The levetiracetam level in the serum is 17.6 with a range of 10-40 mcg/mL.  I advised her to increase the levetiracetam dose to 1500 mg twice a day and repeat the levetiracetam level because the episode of complex behavior with no memory of the event (taking the bottle out of the refrigerator opening it and then closing it with the wrong top) was likely a complex partial seizure or post ictal encephalopathic state, and the relatively low levetiracetam level suggest that she could use a higher dose.  (She and her  are sure that she has been compliant because she does have a pill tray to help compliance with the anticonvulsant regimen).  She declined for the moment a 72-hour ambulatory EEG.  Recommend repeat levetiracetam level after a month and return for follow-up.

## 2022-08-12 NOTE — DATA REVIEWED
[de-identified] :    MR Brain-Seizure, Epilepsy w/wo IV Cont             Final  No Documents Attached       EXAM: [id number] - MR BRAIN SEIZURE EP WAW IC 3D#  - ORDERED BY: HILL MCGILL   PROCEDURE DATE:  07/30/2022    INTERPRETATION:  EXAM: MRI brain with and without contrast  INDICATION: Non-specific neurological symptoms. Seizure  TECHNIQUE: MR examination of brain performed with and without contrast utilizing sagittal/axial/coronal T1 postcontrast, coronal T2/FLAIR hippocampal, axial diffusion-weighted/ADC, axial T2 FLAIR, sagittal T2 FLAIR, coronal T2 FLAIR, axial T1, sagittal T1, coronal T1, axial susceptibility weighted sequences.  6 mL Gadavist administered intravenously.  COMPARISON: June 7, 2021 MRI brain  FINDINGS:   Hippocampi are symmetric in signal and morphology without asymmetric atrophy of the fornices and mamillary bodies identified to suggest mesial temporal sclerosis.  Ventricles are normal in size and configuration for patient's age. No hydrocephalus or extra-axial fluid collection.  No abnormal restricted diffusion identified to suggest acute territorial infarction. No acute intracranial hemorrhage. Mild subcortical and periventricular-white matter T2-weighted hyperintensity, nonspecific but favored to reflect sequela of mild chronic microvascular ischemia. . No significant midline shift, mass effect or herniation. Susceptibility weighted sequences are within normal limits without evidence for chronic blood products. No abnormal parenchymal, leptomeningeal or pachymeningeal enhancement identified. No enhancing intracranial lesion identified.  Visualized proximal arterial and dural venous sinus flow voids preserved on spin-echo sequences. There is mild polypoid mucosal thickening of ethmoid, maxillary sinuses. Mastoid air cells are well aerated.  No suspicious expansile or destructive calvarial lesion identified.  Sella and suprasellar region are unremarkable.   IMPRESSION:  No MR evidence for mesial temporal sclerosis.  No enhancing intracranial lesion identified.  No acute intracranial hemorrhage, acute infarction, extra-axial fluid collection or hydrocephalus.  If there are questions/need for clarification regarding this report, Dr. Yaya Faulkner can be best reached via the patient secure hospital email system at [email or web address].  --- End of Report ---       YAYA RESENDIZ MD; Attending Radiologist This document has been electronically signed. Aug  2 2022  5:42PM      Ordered by: HILL MCGILL       Collected/Examined: [id number] 03:05PM        Verified by: HILL MCGILL [id number] 08:28AM         Result Communication: No patient communication needed at this time; Stage: Final         Performed at: [facility] [place]       Resulted: [id number] 05:29PM       Last Updated: 12Aug2022 08:28AM       Accession: [id number]

## 2022-08-12 NOTE — REVIEW OF SYSTEMS
[Facial Weakness] : no facial weakness [Arm Weakness] : no arm weakness [Hand Weakness] : no hand weakness [Leg Weakness] : no leg weakness [Poor Coordination] : good coordination [Numbness] : no numbness [Tingling] : no tingling [Abnormal Sensation] : no abnormal sensation [Confused or Disoriented] : confusion [Memory Lapses or Loss] : memory loss [Negative] : Heme/Lymph

## 2022-10-03 ENCOUNTER — NON-APPOINTMENT (OUTPATIENT)
Age: 67
End: 2022-10-03

## 2022-10-04 ENCOUNTER — LABORATORY RESULT (OUTPATIENT)
Age: 67
End: 2022-10-04

## 2022-10-04 ENCOUNTER — APPOINTMENT (OUTPATIENT)
Dept: NEUROLOGY | Facility: CLINIC | Age: 67
End: 2022-10-04

## 2022-10-04 VITALS
OXYGEN SATURATION: 100 % | BODY MASS INDEX: 22.82 KG/M2 | HEART RATE: 66 BPM | WEIGHT: 137 LBS | TEMPERATURE: 97.8 F | HEIGHT: 65 IN | SYSTOLIC BLOOD PRESSURE: 114 MMHG | DIASTOLIC BLOOD PRESSURE: 78 MMHG

## 2022-10-04 DIAGNOSIS — R43.1 PAROSMIA: ICD-10-CM

## 2022-10-04 PROCEDURE — 99214 OFFICE O/P EST MOD 30 MIN: CPT

## 2022-10-12 ENCOUNTER — TRANSCRIPTION ENCOUNTER (OUTPATIENT)
Age: 67
End: 2022-10-12

## 2022-10-24 ENCOUNTER — NON-APPOINTMENT (OUTPATIENT)
Age: 67
End: 2022-10-24

## 2022-10-27 ENCOUNTER — APPOINTMENT (OUTPATIENT)
Dept: OTOLARYNGOLOGY | Facility: CLINIC | Age: 67
End: 2022-10-27

## 2022-10-27 VITALS
WEIGHT: 138 LBS | BODY MASS INDEX: 22.99 KG/M2 | SYSTOLIC BLOOD PRESSURE: 108 MMHG | HEIGHT: 65 IN | HEART RATE: 79 BPM | DIASTOLIC BLOOD PRESSURE: 69 MMHG

## 2022-10-27 DIAGNOSIS — R43.9 UNSPECIFIED DISTURBANCES OF SMELL AND TASTE: ICD-10-CM

## 2022-10-27 PROBLEM — R43.1 PAROSMIA: Status: ACTIVE | Noted: 2022-10-27

## 2022-10-27 PROCEDURE — 31231 NASAL ENDOSCOPY DX: CPT

## 2022-10-27 PROCEDURE — 99204 OFFICE O/P NEW MOD 45 MIN: CPT | Mod: 25

## 2022-10-27 RX ORDER — PNV NO.95/FERROUS FUM/FOLIC AC 28MG-0.8MG
TABLET ORAL
Refills: 0 | Status: ACTIVE | COMMUNITY

## 2022-10-27 RX ORDER — TIMOLOL MALEATE 2.5 MG/ML
0.25 SOLUTION/ DROPS OPHTHALMIC
Qty: 5 | Refills: 0 | Status: ACTIVE | COMMUNITY
Start: 2022-07-14

## 2022-10-27 RX ORDER — DICLOFENAC SODIUM 75 MG/1
75 TABLET, DELAYED RELEASE ORAL
Qty: 40 | Refills: 1 | Status: DISCONTINUED | COMMUNITY
Start: 2017-08-18 | End: 2022-10-27

## 2022-10-27 RX ORDER — BRIMONIDINE TARTRATE 2 MG/MG
0.2 SOLUTION/ DROPS OPHTHALMIC
Refills: 0 | Status: ACTIVE | COMMUNITY

## 2022-10-27 RX ORDER — DICLOFENAC SODIUM 75 MG/1
75 TABLET, DELAYED RELEASE ORAL
Qty: 1 | Refills: 1 | Status: DISCONTINUED | COMMUNITY
Start: 2018-12-07 | End: 2022-10-27

## 2022-10-27 RX ORDER — ASPIRIN 81 MG
81 TABLET, DELAYED RELEASE (ENTERIC COATED) ORAL
Refills: 0 | Status: ACTIVE | COMMUNITY

## 2022-10-27 RX ORDER — LEVETIRACETAM 1000 MG/1
1000 TABLET, FILM COATED ORAL TWICE DAILY
Qty: 180 | Refills: 3 | Status: DISCONTINUED | COMMUNITY
Start: 2022-07-12 | End: 2022-10-27

## 2022-10-27 NOTE — HISTORY OF PRESENT ILLNESS
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\par Referred by Dr Yvonne Taylor, Neurologist \par New onset of seizure in 2021, 2nd episode occurred in 04/2022 at which time pt was placed on Keppra \par Since the last seizure, pt has constant unpleasant smells with no identifying source\par The "stink is stronger" in the presence of odorants\par Sense of taste is normal but has decrease in appetite due to odor\par Something in between "burnt toast and feces"\par Frequent sneezing and clear anterior rhinorrhea- getting worse with increasing dose of Keppra \par Intermittent nasal congestion\par Denies facial pain/pressure, PND, epistaxis, recurrent sinus infections\par Using Azelastine as needed with minimal relief of sneezing and runny nose  \par No use of OTC allergy medications \par \par CT from 4/2022, MRI 7/2022-- reviewed personally-- marked DNS, no sinus disease or olfactory recess findings\par \par PMH: Lumbosacral stenosis, Seizures\par PSH: Meniscus Repair, Spinal Fusion, Retina repair x2

## 2022-10-27 NOTE — PHYSICAL EXAM
[Person] : oriented to person [Place] : oriented to place [Time] : oriented to time [Concentration Intact] : normal concentrating ability [Visual Intact] : visual attention was ~T not ~L decreased [Naming Objects] : no difficulty naming common objects [Repeating Phrases] : no difficulty repeating a phrase [Writing A Sentence] : no difficulty writing a sentence [Fluency] : fluency intact [Comprehension] : comprehension intact [Reading] : reading intact [Past History] : adequate knowledge of personal past history [Cranial Nerves Optic (II)] : visual acuity intact bilaterally,  visual fields full to confrontation, pupils equal round and reactive to light [Cranial Nerves Oculomotor (III)] : extraocular motion intact [Cranial Nerves Trigeminal (V)] : facial sensation intact symmetrically [Cranial Nerves Facial (VII)] : face symmetrical [Cranial Nerves Vestibulocochlear (VIII)] : hearing was intact bilaterally [Cranial Nerves Glossopharyngeal (IX)] : tongue and palate midline [Cranial Nerves Accessory (XI - Cranial And Spinal)] : head turning and shoulder shrug symmetric [Cranial Nerves Hypoglossal (XII)] : there was no tongue deviation with protrusion [Motor Strength] : muscle strength was normal in all four extremities [No Muscle Atrophy] : normal bulk in all four extremities [Sensation Tactile Decrease] : light touch was intact [Balance] : balance was intact [2+] : Ankle jerk left 2+ [Sclera] : the sclera and conjunctiva were normal [PERRL With Normal Accommodation] : pupils were equal in size, round, reactive to light, with normal accommodation [Extraocular Movements] : extraocular movements were intact [Outer Ear] : the ears and nose were normal in appearance [Oropharynx] : the oropharynx was normal [Neck Appearance] : the appearance of the neck was normal [Neck Cervical Mass (___cm)] : no neck mass was observed [Jugular Venous Distention Increased] : there was no jugular-venous distention [Thyroid Diffuse Enlargement] : the thyroid was not enlarged [Thyroid Nodule] : there were no palpable thyroid nodules [Auscultation Breath Sounds / Voice Sounds] : lungs were clear to auscultation bilaterally [Heart Rate And Rhythm] : heart rate was normal and rhythm regular [Heart Sounds] : normal S1 and S2 [Heart Sounds Gallop] : no gallops [Murmurs] : no murmurs [Heart Sounds Pericardial Friction Rub] : no pericardial rub [Full Pulse] : the pedal pulses are present [Edema] : there was no peripheral edema [Bowel Sounds] : normal bowel sounds [Abdomen Soft] : soft [Abdomen Tenderness] : non-tender [] : no hepato-splenomegaly [Abdomen Mass (___ Cm)] : no abdominal mass palpated [No CVA Tenderness] : no ~M costovertebral angle tenderness [No Spinal Tenderness] : no spinal tenderness [Abnormal Walk] : normal gait [Nail Clubbing] : no clubbing  or cyanosis of the fingernails [Musculoskeletal - Swelling] : no joint swelling seen [Motor Tone] : muscle strength and tone were normal [Past-pointing] : there was no past-pointing [Tremor] : no tremor present [Plantar Reflex Right Only] : normal on the right [Plantar Reflex Left Only] : normal on the left

## 2022-10-27 NOTE — DISCUSSION/SUMMARY
[FreeTextEntry1] : Reviewed MRI scan head frrom 730/303 no significant abnormality.\par \par Exclude chronic nasal infections and nasal pathology with the help of ENT consultation. She has no history of  chemotherapy smoking. Investigate complex partial epilepsy, by EEG. Check zinc and vitamin A level and begin zinc supplement.

## 2022-10-27 NOTE — CONSULT LETTER
[Dear  ___] : Dear  [unfilled], [( Thank you for referring [unfilled] for consultation for _____ )] : Thank you for referring [unfilled] for consultation for [unfilled] [Please see my note below.] : Please see my note below. [Consult Closing:] : Thank you very much for allowing me to participate in the care of this patient.  If you have any questions, please do not hesitate to contact me. [Sincerely,] : Sincerely, [FreeTextEntry3] : Izaiah Buchanan MD\par Sinus & Endoscopic Skull Base Surgery\par Department of Otolaryngology- Head & Neck Surgery\par [facility]\par [facility]\par \par Phone: [phone]\par Fax: [phone]\par

## 2022-11-03 ENCOUNTER — TRANSCRIPTION ENCOUNTER (OUTPATIENT)
Age: 67
End: 2022-11-03

## 2022-11-15 ENCOUNTER — TRANSCRIPTION ENCOUNTER (OUTPATIENT)
Age: 67
End: 2022-11-15

## 2022-11-15 ENCOUNTER — NON-APPOINTMENT (OUTPATIENT)
Age: 67
End: 2022-11-15

## 2022-11-22 ENCOUNTER — TRANSCRIPTION ENCOUNTER (OUTPATIENT)
Age: 67
End: 2022-11-22

## 2022-11-28 ENCOUNTER — TRANSCRIPTION ENCOUNTER (OUTPATIENT)
Age: 67
End: 2022-11-28

## 2022-12-09 ENCOUNTER — TRANSCRIPTION ENCOUNTER (OUTPATIENT)
Age: 67
End: 2022-12-09

## 2022-12-12 ENCOUNTER — TRANSCRIPTION ENCOUNTER (OUTPATIENT)
Age: 67
End: 2022-12-12

## 2022-12-22 ENCOUNTER — TRANSCRIPTION ENCOUNTER (OUTPATIENT)
Age: 67
End: 2022-12-22

## 2023-01-17 LAB
25(OH)D3 SERPL-MCNC: 34.2 NG/ML
ALBUMIN SERPL ELPH-MCNC: 4.5 G/DL
ALP BLD-CCNC: 59 U/L
ALT SERPL-CCNC: 13 U/L
ANION GAP SERPL CALC-SCNC: 12 MMOL/L
AST SERPL-CCNC: 16 U/L
BASOPHILS # BLD AUTO: 0.06 K/UL
BASOPHILS NFR BLD AUTO: 0.8 %
BILIRUB SERPL-MCNC: 0.2 MG/DL
BUN SERPL-MCNC: 16 MG/DL
CALCIUM SERPL-MCNC: 9.6 MG/DL
CHLORIDE SERPL-SCNC: 104 MMOL/L
CO2 SERPL-SCNC: 24 MMOL/L
CREAT SERPL-MCNC: 0.7 MG/DL
EGFR: 95 ML/MIN/1.73M2
EOSINOPHIL # BLD AUTO: 0.3 K/UL
EOSINOPHIL NFR BLD AUTO: 4 %
GLUCOSE SERPL-MCNC: 84 MG/DL
HCT VFR BLD CALC: 30.7 %
HGB BLD-MCNC: 10.2 G/DL
IMM GRANULOCYTES NFR BLD AUTO: 0.1 %
LEVETIRACETAM SERPL-MCNC: 31.4 UG/ML
LYMPHOCYTES # BLD AUTO: 3.96 K/UL
LYMPHOCYTES NFR BLD AUTO: 53 %
MAN DIFF?: NORMAL
MCHC RBC-ENTMCNC: 21.1 PG
MCHC RBC-ENTMCNC: 33.2 GM/DL
MCV RBC AUTO: 63.6 FL
MONOCYTES # BLD AUTO: 0.33 K/UL
MONOCYTES NFR BLD AUTO: 4.4 %
NEUTROPHILS # BLD AUTO: 2.81 K/UL
NEUTROPHILS NFR BLD AUTO: 37.7 %
PLATELET # BLD AUTO: 393 K/UL
POTASSIUM SERPL-SCNC: 4.7 MMOL/L
PROT SERPL-MCNC: 7 G/DL
RBC # BLD: 4.83 M/UL
RBC # FLD: 20.4 %
SODIUM SERPL-SCNC: 140 MMOL/L
VIT A SERPL-MCNC: 40.4 UG/DL
WBC # FLD AUTO: 7.47 K/UL
ZINC SERPL-MCNC: 86 UG/DL

## 2023-01-26 ENCOUNTER — TRANSCRIPTION ENCOUNTER (OUTPATIENT)
Age: 68
End: 2023-01-26

## 2023-01-27 ENCOUNTER — TRANSCRIPTION ENCOUNTER (OUTPATIENT)
Age: 68
End: 2023-01-27

## 2023-02-01 NOTE — ED PROVIDER NOTE - CROS ED NEURO ALL NEG
[Grade: ____] : Grade: [unfilled] [Normal Performance] : normal performance [Normal Behavior/Attention] : normal behavior/attention [Eats regular meals including adequate fruits and vegetables] : eats regular meals including adequate fruits and vegetables [Has friends] : has friends [Has ways to cope with stress] : has ways to cope with stress [Displays self-confidence] : displays self-confidence [With Teen] : teen [Uses tobacco] : does not use tobacco [Uses drugs] : does not use drugs  [Drinks alcohol] : does not drink alcohol [Has/had oral sex] : has not had oral sex [Has had sexual intercourse] : has not had sexual intercourse [Has problems with sleep] : does not have problems with sleep [Gets depressed, anxious, or irritable/has mood swings] : does not get depressed, anxious, or irritable/has mood swings [Has thought about hurting self or considered suicide] : has not thought about hurting self or considered suicide [de-identified] : lives with mother and father and brother- gets along well  - - -

## 2023-02-03 ENCOUNTER — APPOINTMENT (OUTPATIENT)
Dept: NEUROLOGY | Facility: CLINIC | Age: 68
End: 2023-02-03
Payer: MEDICARE

## 2023-02-03 VITALS
SYSTOLIC BLOOD PRESSURE: 129 MMHG | TEMPERATURE: 98.3 F | HEIGHT: 65 IN | OXYGEN SATURATION: 98 % | BODY MASS INDEX: 21.88 KG/M2 | HEART RATE: 61 BPM | WEIGHT: 131.31 LBS | DIASTOLIC BLOOD PRESSURE: 78 MMHG

## 2023-02-03 DIAGNOSIS — E78.5 HYPERLIPIDEMIA, UNSPECIFIED: ICD-10-CM

## 2023-02-03 PROCEDURE — 99215 OFFICE O/P EST HI 40 MIN: CPT

## 2023-02-07 LAB — LEVETIRACETAM SERPL-MCNC: 25.5 UG/ML

## 2023-02-09 ENCOUNTER — RX RENEWAL (OUTPATIENT)
Age: 68
End: 2023-02-09

## 2023-02-12 NOTE — HISTORY OF PRESENT ILLNESS
[FreeTextEntry1] : Using levetiracetam 750 mg BID; Denies abnormal smells which stopped in November, denies seizure like episodes of confusion  except on Jan 26 when it followed an episode of bad smell and occurred with greater insomnia than usual.\par She complains of insomnia: sleep time 930 pm, no caffeine after 1 pm, no alcohol, exercises in the morning awakens at 1 am and cannot get back to sleep 4 nights in a week.  snores; she has not been wearing the customized ear plugs.

## 2023-02-12 NOTE — DISCUSSION/SUMMARY
[FreeTextEntry1] : atorvastatin for hyperlipidemia and stroke seen on MRI. Will check if 20 mg a day is sufficient by checking cholesterol before next visit. Continue levetiracetam. She declined medication to help her sleep and will try conservative measures.  will check for sleep apnea treatment and

## 2023-02-21 ENCOUNTER — TRANSCRIPTION ENCOUNTER (OUTPATIENT)
Age: 68
End: 2023-02-21

## 2023-02-22 ENCOUNTER — TRANSCRIPTION ENCOUNTER (OUTPATIENT)
Age: 68
End: 2023-02-22

## 2023-03-14 ENCOUNTER — NON-APPOINTMENT (OUTPATIENT)
Age: 68
End: 2023-03-14

## 2023-03-15 ENCOUNTER — APPOINTMENT (OUTPATIENT)
Dept: ORTHOPEDIC SURGERY | Facility: CLINIC | Age: 68
End: 2023-03-15
Payer: MEDICARE

## 2023-03-15 VITALS
HEIGHT: 65 IN | WEIGHT: 136 LBS | DIASTOLIC BLOOD PRESSURE: 68 MMHG | BODY MASS INDEX: 22.66 KG/M2 | HEART RATE: 90 BPM | OXYGEN SATURATION: 96 % | SYSTOLIC BLOOD PRESSURE: 110 MMHG | TEMPERATURE: 97.6 F

## 2023-03-15 DIAGNOSIS — M79.10 MYALGIA, UNSPECIFIED SITE: ICD-10-CM

## 2023-03-15 DIAGNOSIS — M43.16 SPONDYLOLISTHESIS, LUMBAR REGION: ICD-10-CM

## 2023-03-15 DIAGNOSIS — M60.9 MYOSITIS, UNSPECIFIED: ICD-10-CM

## 2023-03-15 DIAGNOSIS — M48.07 SPINAL STENOSIS, LUMBOSACRAL REGION: ICD-10-CM

## 2023-03-15 PROCEDURE — 99214 OFFICE O/P EST MOD 30 MIN: CPT | Mod: 25

## 2023-03-15 PROCEDURE — 72100 X-RAY EXAM L-S SPINE 2/3 VWS: CPT

## 2023-03-15 PROCEDURE — 20552 NJX 1/MLT TRIGGER POINT 1/2: CPT

## 2023-03-15 RX ORDER — METHYLPREDNISOLONE 4 MG/1
4 TABLET ORAL
Qty: 1 | Refills: 1 | Status: ACTIVE | COMMUNITY
Start: 2023-03-15 | End: 1900-01-01

## 2023-06-01 ENCOUNTER — TRANSCRIPTION ENCOUNTER (OUTPATIENT)
Age: 68
End: 2023-06-01

## 2023-06-15 ENCOUNTER — LABORATORY RESULT (OUTPATIENT)
Age: 68
End: 2023-06-15

## 2023-06-16 ENCOUNTER — APPOINTMENT (OUTPATIENT)
Dept: NEUROLOGY | Facility: CLINIC | Age: 68
End: 2023-06-16
Payer: MEDICARE

## 2023-06-16 VITALS
HEART RATE: 58 BPM | DIASTOLIC BLOOD PRESSURE: 76 MMHG | WEIGHT: 136 LBS | HEIGHT: 65 IN | SYSTOLIC BLOOD PRESSURE: 153 MMHG | TEMPERATURE: 97.2 F | BODY MASS INDEX: 22.66 KG/M2 | OXYGEN SATURATION: 100 %

## 2023-06-16 PROCEDURE — 99215 OFFICE O/P EST HI 40 MIN: CPT

## 2023-06-20 LAB
CHOLEST SERPL-MCNC: 182 MG/DL
HDLC SERPL-MCNC: 104 MG/DL
LDLC SERPL CALC-MCNC: 70 MG/DL
NONHDLC SERPL-MCNC: 78 MG/DL
TRIGL SERPL-MCNC: 41 MG/DL

## 2023-06-27 ENCOUNTER — RX RENEWAL (OUTPATIENT)
Age: 68
End: 2023-06-27

## 2023-06-27 LAB
LEVETIRACETAM SERPL-MCNC: 34.8 UG/ML
ZINC SERPL-MCNC: 87 UG/DL

## 2023-06-28 NOTE — DISCUSSION/SUMMARY
[FreeTextEntry1] : Repeat ambulatory EEG in 2024. If seizure free consider discontinuing levetiracetam. Her adult onset seizure was likely related to an acute lacunar stroke. It is possible that she may become seizure free with time. Would require EEG unit admission for withdrawal of anticonvulsants. Lipid panel and levetiracetam to be checked

## 2023-06-28 NOTE — HISTORY OF PRESENT ILLNESS
[FreeTextEntry1] : No longer experiences the unpleasant smell that was her predominant complaint last visit. Denies recurrence of altered mental status at night or seizures.

## 2023-08-15 ENCOUNTER — RX RENEWAL (OUTPATIENT)
Age: 68
End: 2023-08-15

## 2023-08-25 ENCOUNTER — RX RENEWAL (OUTPATIENT)
Age: 68
End: 2023-08-25

## 2023-08-25 RX ORDER — LEVETIRACETAM 750 MG/1
750 TABLET, FILM COATED ORAL TWICE DAILY
Qty: 360 | Refills: 3 | Status: ACTIVE | COMMUNITY
Start: 2022-08-12 | End: 1900-01-01

## 2023-10-03 ENCOUNTER — TRANSCRIPTION ENCOUNTER (OUTPATIENT)
Age: 68
End: 2023-10-03

## 2023-10-06 ENCOUNTER — TRANSCRIPTION ENCOUNTER (OUTPATIENT)
Age: 68
End: 2023-10-06

## 2023-10-11 ENCOUNTER — TRANSCRIPTION ENCOUNTER (OUTPATIENT)
Age: 68
End: 2023-10-11

## 2023-10-12 ENCOUNTER — TRANSCRIPTION ENCOUNTER (OUTPATIENT)
Age: 68
End: 2023-10-12

## 2024-01-16 ENCOUNTER — TRANSCRIPTION ENCOUNTER (OUTPATIENT)
Age: 69
End: 2024-01-16

## 2024-01-17 ENCOUNTER — TRANSCRIPTION ENCOUNTER (OUTPATIENT)
Age: 69
End: 2024-01-17

## 2024-01-17 ENCOUNTER — NON-APPOINTMENT (OUTPATIENT)
Age: 69
End: 2024-01-17

## 2024-01-24 ENCOUNTER — RX RENEWAL (OUTPATIENT)
Age: 69
End: 2024-01-24

## 2024-01-24 RX ORDER — ATORVASTATIN CALCIUM 20 MG/1
20 TABLET, FILM COATED ORAL
Qty: 90 | Refills: 3 | Status: ACTIVE | COMMUNITY
Start: 2023-02-03 | End: 1900-01-01

## 2024-01-25 ENCOUNTER — TRANSCRIPTION ENCOUNTER (OUTPATIENT)
Age: 69
End: 2024-01-25

## 2024-01-30 ENCOUNTER — APPOINTMENT (OUTPATIENT)
Dept: NEUROLOGY | Facility: CLINIC | Age: 69
End: 2024-01-30
Payer: MEDICARE

## 2024-01-30 VITALS
HEIGHT: 65 IN | SYSTOLIC BLOOD PRESSURE: 116 MMHG | OXYGEN SATURATION: 99 % | TEMPERATURE: 98 F | BODY MASS INDEX: 22.74 KG/M2 | DIASTOLIC BLOOD PRESSURE: 72 MMHG | HEART RATE: 68 BPM | WEIGHT: 136.5 LBS

## 2024-01-30 PROCEDURE — 99214 OFFICE O/P EST MOD 30 MIN: CPT

## 2024-01-30 RX ORDER — LATANOPROST/PF 0.005 %
0.01 DROPS OPHTHALMIC (EYE)
Refills: 0 | Status: ACTIVE | COMMUNITY

## 2024-01-30 RX ORDER — DORZOLAMIDE HYDROCHLORIDE 20 MG/ML
2 SOLUTION OPHTHALMIC
Refills: 0 | Status: ACTIVE | COMMUNITY

## 2024-01-30 NOTE — PHYSICAL EXAM
[Person] : oriented to person [Place] : oriented to place [Time] : oriented to time [Concentration Intact] : normal concentrating ability [Visual Intact] : visual attention was ~T not ~L decreased [Naming Objects] : no difficulty naming common objects [Repeating Phrases] : no difficulty repeating a phrase [Writing A Sentence] : no difficulty writing a sentence [Fluency] : fluency intact [Comprehension] : comprehension intact [Reading] : reading intact [Past History] : adequate knowledge of personal past history [Cranial Nerves Optic (II)] : visual acuity intact bilaterally,  visual fields full to confrontation, pupils equal round and reactive to light [Cranial Nerves Oculomotor (III)] : extraocular motion intact [Cranial Nerves Trigeminal (V)] : facial sensation intact symmetrically [Cranial Nerves Facial (VII)] : face symmetrical [Cranial Nerves Vestibulocochlear (VIII)] : hearing was intact bilaterally [Cranial Nerves Glossopharyngeal (IX)] : tongue and palate midline [Cranial Nerves Accessory (XI - Cranial And Spinal)] : head turning and shoulder shrug symmetric [Cranial Nerves Hypoglossal (XII)] : there was no tongue deviation with protrusion [Motor Tone] : muscle tone was normal in all four extremities [Motor Strength] : muscle strength was normal in all four extremities [No Muscle Atrophy] : normal bulk in all four extremities [Sensation Tactile Decrease] : light touch was intact [Abnormal Walk] : normal gait [Balance] : balance was intact [2+] : Ankle jerk left 2+ [Outer Ear] : the ears and nose were normal in appearance [Oropharynx] : the oropharynx was normal [Neck Appearance] : the appearance of the neck was normal [Neck Cervical Mass (___cm)] : no neck mass was observed [Jugular Venous Distention Increased] : there was no jugular-venous distention [Thyroid Diffuse Enlargement] : the thyroid was not enlarged [Thyroid Nodule] : there were no palpable thyroid nodules [] : no respiratory distress [Auscultation Breath Sounds / Voice Sounds] : lungs were clear to auscultation bilaterally [Past-pointing] : there was no past-pointing [Tremor] : no tremor present [Plantar Reflex Right Only] : normal on the right [Plantar Reflex Left Only] : normal on the left

## 2024-01-30 NOTE — DISCUSSION/SUMMARY
[FreeTextEntry1] : Advised to ensure she takes her medication before the ophthalmology procedure and after the procedure as well.  Also advised to be careful about doses during previous of stress and perhaps increase the dose of levetiracetam by 250 mg (half tablet) in the morning and 250 mg in the evening until after the.  Of stress is over.   She is advised to wait another year before doing the 72-hour EEG when we will decide whether she needs to continue the medication.  At this time it is  clear that she needs to continue medication because of his recent seizures.  I have reviewed her previous lipid levels which are normal.  She is compliant with atorvastatin and aspirin that she uses because she had evidence of a stroke on the previous MRI of the head when she first presented to the hospital in 2021 with her first seizure. 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

## 2024-01-30 NOTE — HISTORY OF PRESENT ILLNESS
[FreeTextEntry1] :  07/12/2021 Was admitted to [facility] because of a witnessed seizure when in bed. Her  was the witness; she had not slept well that night. MRI head demonstrated small old lacunar basal ganglia infarcts and EEG was abnormal. She is not using an anti-convulsant but has not returned to work. 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 Found to have a lacunar stroke on MRI which was a surprise. 4/15/2022 Seizure on 4/4 went to the hospital and started on levetiracetam; On 4/13 she took a nap and awoke groggy. Falls asleep at 8 pm and awakens early in the morning and other body rhythms appear disrupted.  No longer experiences the unpleasant smell that was her predominant complaint last visit. Denies recurrence of altered mental status at night or seizures.  1/30/2024: In November 2023 she had to fly to [place] to visit her mother who was on hospice treatment.  After she returned she suffered a generalized seizure witnessed by her  that lasted less than a minute but it took her nearly 15 minutes to regain her normal level of cognitive functioning and she had aches all over for the whole day afterwards.  She had taken her levetiracetam tablets to [place] and reports compliance.  Earlier this year when her mother passed she had another brief seizure.  She has given her levetiracetam level test 4 days ago and the result is not yet ready. She has been told to prepare for a glaucoma treatment procedure.  She denies abnormal smells or any new symptoms.

## 2024-02-01 LAB
CHOLEST SERPL-MCNC: 184 MG/DL
HDLC SERPL-MCNC: 108 MG/DL
LDLC SERPL CALC-MCNC: 65 MG/DL
LEVETIRACETAM SERPL-MCNC: 33.3 UG/ML
NONHDLC SERPL-MCNC: 76 MG/DL
TRIGL SERPL-MCNC: 55 MG/DL

## 2024-02-07 LAB — ZINC SERPL-MCNC: 87 UG/DL

## 2024-05-28 ENCOUNTER — TRANSCRIPTION ENCOUNTER (OUTPATIENT)
Age: 69
End: 2024-05-28

## 2024-06-11 ENCOUNTER — TRANSCRIPTION ENCOUNTER (OUTPATIENT)
Age: 69
End: 2024-06-11

## 2024-06-12 ENCOUNTER — TRANSCRIPTION ENCOUNTER (OUTPATIENT)
Age: 69
End: 2024-06-12

## 2024-07-16 NOTE — ED PROVIDER NOTE - HIV OFFER
Hospice Discharge Summary    [facility]  Good Help to Those in Need        Date of Admission: 7/13/2024  Date of Discharge: 7/15/2024    Ruperto Pantoja is a 89 y.o. year old who was admitted to [facility] at [facility] with a Hospice diagnosis of Acute metabolic encephalopathy [G93.41].        The patient was discharged to [facility] for ongoing Hospice care.    Opt out

## 2024-07-25 ENCOUNTER — TRANSCRIPTION ENCOUNTER (OUTPATIENT)
Age: 69
End: 2024-07-25

## 2024-07-29 ENCOUNTER — NON-APPOINTMENT (OUTPATIENT)
Age: 69
End: 2024-07-29

## 2024-07-29 LAB
CHOLEST SERPL-MCNC: 170 MG/DL
HDLC SERPL-MCNC: 87 MG/DL
LDLC SERPL CALC-MCNC: 57 MG/DL
NONHDLC SERPL-MCNC: 83 MG/DL
TRIGL SERPL-MCNC: 159 MG/DL

## 2024-07-29 NOTE — ED ADULT NURSE NOTE - BEFAST FACE
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        Post Anesthesia Care and Evaluation    Patient location during evaluation: PACU  Patient participation: complete - patient participated  Level of consciousness: awake  Pain scale: See nurse's notes for pain score.  Pain management: adequate    Airway patency: patent  Anesthetic complications: No anesthetic complications  PONV Status: none  Cardiovascular status: acceptable  Respiratory status: acceptable and spontaneous ventilation  Hydration status: acceptable    Comments: Patient seen and examined postoperatively; vital signs stable; SpO2 greater than or equal to 90%; cardiopulmonary status stable; nausea/vomiting adequately controlled; pain adequately controlled; no apparent anesthesia complications; patient discharged from anesthesia care when discharge criteria were met     No

## 2024-07-30 ENCOUNTER — APPOINTMENT (OUTPATIENT)
Dept: NEUROLOGY | Facility: CLINIC | Age: 69
End: 2024-07-30
Payer: MEDICARE

## 2024-07-30 VITALS
BODY MASS INDEX: 26.32 KG/M2 | HEART RATE: 68 BPM | SYSTOLIC BLOOD PRESSURE: 134 MMHG | OXYGEN SATURATION: 98 % | DIASTOLIC BLOOD PRESSURE: 73 MMHG | TEMPERATURE: 97.2 F | WEIGHT: 134.06 LBS | HEIGHT: 60 IN

## 2024-07-30 DIAGNOSIS — R53.83 DEPRESSION, UNSPECIFIED: ICD-10-CM

## 2024-07-30 DIAGNOSIS — R53.83 OTHER FATIGUE: ICD-10-CM

## 2024-07-30 DIAGNOSIS — Z86.69 PERSONAL HISTORY OF OTHER DISEASES OF THE NERVOUS SYSTEM AND SENSE ORGANS: ICD-10-CM

## 2024-07-30 DIAGNOSIS — F32.A DEPRESSION, UNSPECIFIED: ICD-10-CM

## 2024-07-30 LAB
LEVETIRACETAM SERPL-MCNC: 24 UG/ML
ZINC SERPL-MCNC: 77 UG/DL

## 2024-07-30 PROCEDURE — 99213 OFFICE O/P EST LOW 20 MIN: CPT

## 2024-07-30 NOTE — DISCUSSION/SUMMARY
[FreeTextEntry1] : Her episode may have been a partial seizure.  However she does have fatigue, and she is not sure whether she had hydrated herself enough the day she had the episode.  Recommend thyroid function tests and CBC CMP to exclude electrolyte abnormalities  She could have hypothyroidism and hypotension.  However to check her levetiracetam level we did do a blood test and it shows therapeutic levetiracetam on 26 July.

## 2024-07-30 NOTE — PHYSICAL EXAM
[Person] : oriented to person [Place] : oriented to place [Time] : oriented to time [Concentration Intact] : normal concentrating ability [Visual Intact] : visual attention was ~T not ~L decreased [Naming Objects] : no difficulty naming common objects [Repeating Phrases] : no difficulty repeating a phrase [Writing A Sentence] : no difficulty writing a sentence [Fluency] : fluency intact [Comprehension] : comprehension intact [Reading] : reading intact [Past History] : adequate knowledge of personal past history [Cranial Nerves Optic (II)] : visual acuity intact bilaterally,  visual fields full to confrontation, pupils equal round and reactive to light [Cranial Nerves Oculomotor (III)] : extraocular motion intact [Cranial Nerves Trigeminal (V)] : facial sensation intact symmetrically [Cranial Nerves Vestibulocochlear (VIII)] : hearing was intact bilaterally [Cranial Nerves Facial (VII)] : face symmetrical [Cranial Nerves Glossopharyngeal (IX)] : tongue and palate midline [Cranial Nerves Accessory (XI - Cranial And Spinal)] : head turning and shoulder shrug symmetric [Cranial Nerves Hypoglossal (XII)] : there was no tongue deviation with protrusion [Motor Tone] : muscle tone was normal in all four extremities [Motor Strength] : muscle strength was normal in all four extremities [No Muscle Atrophy] : normal bulk in all four extremities [Sensation Tactile Decrease] : light touch was intact [Sensation Vibration Decrease] : vibration was intact [Abnormal Walk] : normal gait [Balance] : balance was intact [Past-pointing] : there was no past-pointing [Tremor] : no tremor present [2+] : Patella left 2+ [1+] : Ankle jerk left 1+ [Plantar Reflex Right Only] : normal on the right [Plantar Reflex Left Only] : normal on the left [Outer Ear] : the ears and nose were normal in appearance [Oropharynx] : the oropharynx was normal [FreeTextEntry1] : Enlarged thyroid multinodular [Auscultation Breath Sounds / Voice Sounds] : lungs were clear to auscultation bilaterally [Heart Rate And Rhythm] : heart rate was normal and rhythm regular [Heart Sounds] : normal S1 and S2 [Heart Sounds Gallop] : no gallops [Murmurs] : no murmurs [Heart Sounds Pericardial Friction Rub] : no pericardial rub [Full Pulse] : the pedal pulses are present [Edema] : there was no peripheral edema [Bowel Sounds] : normal bowel sounds [Abdomen Soft] : soft [Abdomen Tenderness] : non-tender [] : no hepato-splenomegaly [Abdomen Mass (___ Cm)] : no abdominal mass palpated [No CVA Tenderness] : no ~M costovertebral angle tenderness [No Spinal Tenderness] : no spinal tenderness

## 2024-07-30 NOTE — ASSESSMENT
[FreeTextEntry1] : Lacunar stroke.  Continue atorvastatin and aspirin Partial complex seizures and seizure disorder.  Continue levetiracetam Multinodular goiter follow-up with endocrinology

## 2024-07-30 NOTE — HISTORY OF PRESENT ILLNESS
[FreeTextEntry1] :  07/12/2021 Was admitted to [facility] because of a witnessed seizure when in bed. Her  was the witness; she had not slept well that night. MRI head demonstrated small old lacunar basal ganglia infarcts and EEG was abnormal. She is not using an anti-convulsant but has not returned to work. 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 Found to have a lacunar stroke on MRI which was a surprise. 4/15/2022 Seizure on 4/4 went to the hospital and started on levetiracetam; On 4/13 she took a nap and awoke groggy. Falls asleep at 8 pm and awakens early in the morning and other body rhythms appear disrupted.  No longer experiences the unpleasant smell that was her predominant complaint last visit. Denies recurrence of altered mental status at night or seizures.  1/30/2024: In November 2023 she had to fly to [place] to visit her mother who was on hospice treatment.  After she returned she suffered a generalized seizure witnessed by her  that lasted less than a minute but it took her nearly 15 minutes to regain her normal level of cognitive functioning and she had aches all over for the whole day afterwards.  She had taken her levetiracetam tablets to [place] and reports compliance.  Earlier this year when her mother passed she had another brief seizure.  She has given her levetiracetam level test 4 days ago and the result is not yet ready. She has been told to prepare for a glaucoma treatment procedure.  She denies abnormal smells or any new symptoms. 7/30/2024.  She had 1 episode of hypotension with altered sensorium cramping of the feet extension of the toe cold clammy skin.  She reports compliance with levetiracetam.  Her  reports that she has been fatigued.  She has started to work 3 half-days a week going into the city to work.  She finds it satisfying and fulfilling.  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

## 2024-08-01 ENCOUNTER — LABORATORY RESULT (OUTPATIENT)
Age: 69
End: 2024-08-01

## 2024-08-01 LAB
ALBUMIN SERPL ELPH-MCNC: 4.7 G/DL
ALP BLD-CCNC: 77 U/L
ALT SERPL-CCNC: 19 U/L
ANION GAP SERPL CALC-SCNC: 15 MMOL/L
AST SERPL-CCNC: 22 U/L
BASOPHILS # BLD AUTO: 0.04 K/UL
BASOPHILS NFR BLD AUTO: 0.6 %
BILIRUB SERPL-MCNC: 0.3 MG/DL
BUN SERPL-MCNC: 16 MG/DL
CALCIUM SERPL-MCNC: 9.7 MG/DL
CHLORIDE SERPL-SCNC: 105 MMOL/L
CO2 SERPL-SCNC: 22 MMOL/L
CREAT SERPL-MCNC: 0.73 MG/DL
EGFR: 90 ML/MIN/1.73M2
EOSINOPHIL # BLD AUTO: 0.36 K/UL
EOSINOPHIL NFR BLD AUTO: 5.7 %
GLUCOSE SERPL-MCNC: 78 MG/DL
HCT VFR BLD CALC: 29 %
HGB BLD-MCNC: 8.8 G/DL
IMM GRANULOCYTES NFR BLD AUTO: 0.2 %
LYMPHOCYTES # BLD AUTO: 3.01 K/UL
LYMPHOCYTES NFR BLD AUTO: 47.8 %
MAN DIFF?: NORMAL
MCHC RBC-ENTMCNC: 17.4 PG
MCHC RBC-ENTMCNC: 30.3 GM/DL
MCV RBC AUTO: 57.3 FL
MONOCYTES # BLD AUTO: 0.43 K/UL
MONOCYTES NFR BLD AUTO: 6.8 %
NEUTROPHILS # BLD AUTO: 2.45 K/UL
NEUTROPHILS NFR BLD AUTO: 38.9 %
PLATELET # BLD AUTO: 411 K/UL
POTASSIUM SERPL-SCNC: 4.6 MMOL/L
PROT SERPL-MCNC: 7.2 G/DL
RBC # BLD: 5.06 M/UL
RBC # FLD: 23.3 %
SODIUM SERPL-SCNC: 141 MMOL/L
TSH SERPL-ACNC: 0.49 UIU/ML
WBC # FLD AUTO: 6.3 K/UL

## 2024-08-09 ENCOUNTER — TRANSCRIPTION ENCOUNTER (OUTPATIENT)
Age: 69
End: 2024-08-09

## 2024-08-09 ENCOUNTER — RX RENEWAL (OUTPATIENT)
Age: 69
End: 2024-08-09

## 2024-08-09 PROBLEM — D50.8 OTHER IRON DEFICIENCY ANEMIA: Status: ACTIVE | Noted: 2024-08-09

## 2024-08-15 ENCOUNTER — APPOINTMENT (OUTPATIENT)
Dept: PEDIATRIC MEDICAL GENETICS | Facility: CLINIC | Age: 69
End: 2024-08-15
Payer: MEDICARE

## 2024-08-15 PROCEDURE — ZZZZZ: CPT

## 2024-09-02 ENCOUNTER — TRANSCRIPTION ENCOUNTER (OUTPATIENT)
Age: 69
End: 2024-09-02

## 2024-09-03 ENCOUNTER — TRANSCRIPTION ENCOUNTER (OUTPATIENT)
Age: 69
End: 2024-09-03

## 2024-09-13 ENCOUNTER — APPOINTMENT (OUTPATIENT)
Dept: NEUROLOGY | Facility: CLINIC | Age: 69
End: 2024-09-13

## 2024-09-13 PROCEDURE — 95816 EEG AWAKE AND DROWSY: CPT

## 2024-09-14 PROCEDURE — 95708 EEG WO VID EA 12-26HR UNMNTR: CPT

## 2024-09-15 PROCEDURE — 95708 EEG WO VID EA 12-26HR UNMNTR: CPT

## 2024-09-16 PROCEDURE — 95700 EEG CONT REC W/VID EEG TECH: CPT

## 2024-09-16 PROCEDURE — 95708 EEG WO VID EA 12-26HR UNMNTR: CPT

## 2024-09-16 PROCEDURE — 95723 EEG PHY/QHP>60<84 HR W/O VID: CPT

## 2024-09-20 ENCOUNTER — NON-APPOINTMENT (OUTPATIENT)
Age: 69
End: 2024-09-20

## 2024-09-20 ENCOUNTER — TRANSCRIPTION ENCOUNTER (OUTPATIENT)
Age: 69
End: 2024-09-20

## 2024-09-26 ENCOUNTER — TRANSCRIPTION ENCOUNTER (OUTPATIENT)
Age: 69
End: 2024-09-26

## 2024-09-26 ENCOUNTER — APPOINTMENT (OUTPATIENT)
Dept: PEDIATRIC MEDICAL GENETICS | Facility: CLINIC | Age: 69
End: 2024-09-26
Payer: MEDICARE

## 2024-09-26 PROCEDURE — ZZZZZ: CPT

## 2024-10-01 ENCOUNTER — APPOINTMENT (OUTPATIENT)
Dept: PEDIATRIC MEDICAL GENETICS | Facility: CLINIC | Age: 69
End: 2024-10-01
Payer: MEDICARE

## 2024-10-01 DIAGNOSIS — Z82.49 FAMILY HISTORY OF ISCHEMIC HEART DISEASE AND OTHER DISEASES OF THE CIRCULATORY SYSTEM: ICD-10-CM

## 2024-10-01 DIAGNOSIS — Z80.0 FAMILY HISTORY OF MALIGNANT NEOPLASM OF DIGESTIVE ORGANS: ICD-10-CM

## 2024-10-01 DIAGNOSIS — N96 RECURRENT PREGNANCY LOSS: ICD-10-CM

## 2024-10-01 DIAGNOSIS — Z82.0 FAMILY HISTORY OF EPILEPSY AND OTHER DISEASES OF THE NERVOUS SYSTEM: ICD-10-CM

## 2024-10-01 DIAGNOSIS — Z80.9 FAMILY HISTORY OF MALIGNANT NEOPLASM, UNSPECIFIED: ICD-10-CM

## 2024-10-01 DIAGNOSIS — Z81.0 FAMILY HISTORY OF INTELLECTUAL DISABILITIES: ICD-10-CM

## 2024-10-01 DIAGNOSIS — Z71.83 ENCOUNTER FOR NONPROCREATIVE GENETIC COUNSELING: ICD-10-CM

## 2024-10-01 PROCEDURE — ZZZZZ: CPT

## 2024-10-03 PROBLEM — Z71.83 ENCOUNTER FOR NONPROCREATIVE GENETIC COUNSELING: Status: ACTIVE | Noted: 2024-10-03

## 2024-10-03 PROBLEM — Z82.0: Status: ACTIVE | Noted: 2024-10-03

## 2024-10-06 PROBLEM — Z80.0 FAMILY HISTORY OF COLON CANCER: Status: ACTIVE | Noted: 2024-10-06

## 2024-10-06 PROBLEM — Z82.49 FHX: HEART DISEASE: Status: ACTIVE | Noted: 2024-10-06

## 2024-10-06 PROBLEM — N96 HISTORY OF MULTIPLE SPONTANEOUS ABORTIONS: Status: ACTIVE | Noted: 2024-10-06

## 2024-10-06 PROBLEM — Z81.0 FAMILY HISTORY OF INTELLECTUAL DISABILITY: Status: ACTIVE | Noted: 2024-10-06

## 2024-12-18 ENCOUNTER — APPOINTMENT (OUTPATIENT)
Dept: ENDOCRINOLOGY | Facility: CLINIC | Age: 69
End: 2024-12-18
Payer: MEDICARE

## 2024-12-18 VITALS
DIASTOLIC BLOOD PRESSURE: 77 MMHG | BODY MASS INDEX: 26.5 KG/M2 | WEIGHT: 135 LBS | RESPIRATION RATE: 16 BRPM | SYSTOLIC BLOOD PRESSURE: 152 MMHG | TEMPERATURE: 97.3 F | HEIGHT: 60 IN | HEART RATE: 63 BPM | OXYGEN SATURATION: 98 %

## 2024-12-18 VITALS — DIASTOLIC BLOOD PRESSURE: 79 MMHG | SYSTOLIC BLOOD PRESSURE: 126 MMHG

## 2024-12-18 DIAGNOSIS — E04.2 NONTOXIC MULTINODULAR GOITER: ICD-10-CM

## 2024-12-18 DIAGNOSIS — Z83.49 FAMILY HISTORY OF OTHER ENDOCRINE, NUTRITIONAL AND METABOLIC DISEASES: ICD-10-CM

## 2024-12-18 DIAGNOSIS — Z86.69 PERSONAL HISTORY OF OTHER DISEASES OF THE NERVOUS SYSTEM AND SENSE ORGANS: ICD-10-CM

## 2024-12-18 PROCEDURE — 99204 OFFICE O/P NEW MOD 45 MIN: CPT

## 2024-12-24 LAB
T4 FREE SERPL-MCNC: 1.5 NG/DL
THYROPEROXIDASE AB SERPL IA-ACNC: 9.8 IU/ML
TSH RECEPTOR AB: <1.1 IU/L
TSH SERPL-ACNC: 0.47 UIU/ML
TSI ACT/NOR SER: <0.1 IU/L

## 2024-12-24 NOTE — HISTORY OF PRESENT ILLNESS
Mother reports child has had cough for 3 weeks, last night noted fever 104, gave Tylenol 40 minutes ago. Reports that coughing gets severe triggering vomiting.    Triage Assessment (Pediatric)       Row Name 12/24/24 0436          Triage Assessment    Airway WDL WDL        Respiratory WDL    Respiratory WDL X;cough     Cough Type congested;productive        Skin Circulation/Temperature WDL    Skin Circulation/Temperature WDL WDL        Cardiac WDL    Cardiac WDL WDL        Peripheral/Neurovascular WDL    Peripheral Neurovascular WDL WDL        Cognitive/Neuro/Behavioral WDL    Cognitive/Neuro/Behavioral WDL WDL                     
[FreeTextEntry1] : Was admitted to [facility] because of a witnessed seizure when in bed. Her  was the witness; she had not slept well that night. MRI head demonstrated small old lacunar basal ganglia infarcts and EEG was abnormal. She is not using an anti-convulsant but has not returned to work. 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

## 2024-12-27 ENCOUNTER — RESULT REVIEW (OUTPATIENT)
Age: 69
End: 2024-12-27

## 2024-12-27 ENCOUNTER — APPOINTMENT (OUTPATIENT)
Dept: INTERVENTIONAL RADIOLOGY/VASCULAR | Facility: HOSPITAL | Age: 69
End: 2024-12-27

## 2024-12-27 ENCOUNTER — OUTPATIENT (OUTPATIENT)
Dept: OUTPATIENT SERVICES | Facility: HOSPITAL | Age: 69
LOS: 1 days | End: 2024-12-27
Payer: MEDICARE

## 2024-12-27 DIAGNOSIS — E04.2 NONTOXIC MULTINODULAR GOITER: ICD-10-CM

## 2024-12-27 DIAGNOSIS — Z98.89 OTHER SPECIFIED POSTPROCEDURAL STATES: Chronic | ICD-10-CM

## 2024-12-27 PROCEDURE — 60100 BIOPSY OF THYROID: CPT

## 2024-12-27 PROCEDURE — 88173 CYTOPATH EVAL FNA REPORT: CPT

## 2024-12-27 PROCEDURE — 88172 CYTP DX EVAL FNA 1ST EA SITE: CPT | Mod: 26

## 2024-12-27 PROCEDURE — 88173 CYTOPATH EVAL FNA REPORT: CPT | Mod: 26

## 2024-12-27 PROCEDURE — 76942 ECHO GUIDE FOR BIOPSY: CPT

## 2024-12-27 PROCEDURE — 76942 ECHO GUIDE FOR BIOPSY: CPT | Mod: 26

## 2024-12-27 PROCEDURE — 88172 CYTP DX EVAL FNA 1ST EA SITE: CPT

## 2024-12-30 LAB — NON-GYNECOLOGICAL CYTOLOGY STUDY: SIGNIFICANT CHANGE UP

## 2025-01-13 ENCOUNTER — RX RENEWAL (OUTPATIENT)
Age: 70
End: 2025-01-13

## 2025-02-14 ENCOUNTER — TRANSCRIPTION ENCOUNTER (OUTPATIENT)
Age: 70
End: 2025-02-14

## 2025-02-19 LAB
ALBUMIN SERPL ELPH-MCNC: 4.6 G/DL
ALP BLD-CCNC: 73 U/L
ALT SERPL-CCNC: 30 U/L
ANION GAP SERPL CALC-SCNC: 11 MMOL/L
AST SERPL-CCNC: 23 U/L
BASOPHILS # BLD AUTO: 0.04 K/UL
BASOPHILS NFR BLD AUTO: 0.5 %
BILIRUB SERPL-MCNC: 0.6 MG/DL
BUN SERPL-MCNC: 17 MG/DL
CALCIUM SERPL-MCNC: 9.8 MG/DL
CHLORIDE SERPL-SCNC: 103 MMOL/L
CO2 SERPL-SCNC: 26 MMOL/L
CREAT SERPL-MCNC: 0.76 MG/DL
EGFR: 85 ML/MIN/1.73M2
EOSINOPHIL # BLD AUTO: 0.2 K/UL
EOSINOPHIL NFR BLD AUTO: 2.7 %
GLUCOSE SERPL-MCNC: 78 MG/DL
HCT VFR BLD CALC: 39.7 %
HGB BLD-MCNC: 14.1 G/DL
IMM GRANULOCYTES NFR BLD AUTO: 0.1 %
LEVETIRACETAM SERPL-MCNC: 36.9 UG/ML
LYMPHOCYTES # BLD AUTO: 3.2 K/UL
LYMPHOCYTES NFR BLD AUTO: 43.1 %
MAN DIFF?: NORMAL
MCHC RBC-ENTMCNC: 28 PG
MCHC RBC-ENTMCNC: 35.5 G/DL
MCV RBC AUTO: 78.9 FL
MONOCYTES # BLD AUTO: 0.39 K/UL
MONOCYTES NFR BLD AUTO: 5.3 %
NEUTROPHILS # BLD AUTO: 3.58 K/UL
NEUTROPHILS NFR BLD AUTO: 48.3 %
PLATELET # BLD AUTO: 329 K/UL
POTASSIUM SERPL-SCNC: 4.6 MMOL/L
PROT SERPL-MCNC: 7.1 G/DL
RBC # BLD: 5.03 M/UL
RBC # FLD: 15.4 %
SODIUM SERPL-SCNC: 140 MMOL/L
WBC # FLD AUTO: 7.42 K/UL

## 2025-02-21 ENCOUNTER — NON-APPOINTMENT (OUTPATIENT)
Age: 70
End: 2025-02-21

## 2025-02-21 ENCOUNTER — APPOINTMENT (OUTPATIENT)
Dept: NEUROLOGY | Facility: CLINIC | Age: 70
End: 2025-02-21
Payer: MEDICARE

## 2025-02-21 VITALS
DIASTOLIC BLOOD PRESSURE: 68 MMHG | BODY MASS INDEX: 26.91 KG/M2 | WEIGHT: 137.06 LBS | HEART RATE: 58 BPM | SYSTOLIC BLOOD PRESSURE: 132 MMHG | TEMPERATURE: 97 F | OXYGEN SATURATION: 99 % | HEIGHT: 60 IN

## 2025-02-21 LAB — ZINC SERPL-MCNC: 107 UG/DL

## 2025-02-21 PROCEDURE — 99213 OFFICE O/P EST LOW 20 MIN: CPT

## 2025-02-21 RX ORDER — NETARSUDIL AND LATANOPROST OPHTHALMIC SOLUTION, 0.02%/0.005% .2; .05 MG/ML; MG/ML
0.02-0.005 SOLUTION/ DROPS OPHTHALMIC; TOPICAL
Refills: 0 | Status: ACTIVE | COMMUNITY

## 2025-02-22 ENCOUNTER — NON-APPOINTMENT (OUTPATIENT)
Age: 70
End: 2025-02-22

## 2025-03-04 ENCOUNTER — TRANSCRIPTION ENCOUNTER (OUTPATIENT)
Age: 70
End: 2025-03-04

## 2025-03-10 NOTE — ED ADULT TRIAGE NOTE - ACCOMPANIED BY
Date of Service  03-10-25 @ 12:15    CHIEF COMPLAINT:Patient is a 89y old  Female who presents with a chief complaint of HTN urgency, Hypokalemia, URI, Type 2 MI .      HPI:  89y F, from home, ambulates with cane/walker, with PMH of HTN, HLD, Breast CA s/p R mastectomy, Glaucoma, Calculus of bile duct s/p ERCP, Vertigo, Migraine, Renal cyst, Fatty liver, and GERD p/w several days of sore throat, nasal congestion, R ear pain, dry cough, and epigastric pain. Reports decreased appetite and decreased PO intake over the last 3 days. Patient states she did not take her home BP medication prior to coming to ED. Reports some nausea this AM but denies vomiting. Granddaughter at bedside reports that patient has very sensitive stomach and has gone over list of foods to avoid with her PCP. Patient also reports frequent burping. Complains of sneezing and subsequent flatus whenever she sneezes recently. Denies fecal incontinence. Reports normal and regular BMs with last BM yesterday afternoon. Complains of painful itchy rash on scalp that frequently flares when sick or under stress. Follows with dermatologist who has prescribed multiple creams and shampoos with limited efficacy. Patient reports fall 3 days prior to admission and injury to R knee which is the knee she had replaced. States that since fall walking has been more difficult. Denies fever, chills, headache, dizziness, chest pain, palpitations, shortness of breath, vomiting, diarrhea, constipation, and dysuria.        In the ED:  T(F): , Max: 97.9 (17:47); HR:  (67 - 70); BP:  (189/79 - 215/85); RR:  (20 - 24); SpO2:  (99% - 100%)  WBC:9.14, Hb.5, PLT:276  Na:137, K:2.7, Cl:103, HCO3:25, BUN:10, sCr:0.81  AST:16, ALT:18, ALP:75, Tbili:0.4, Lipase:70   Troponin:228.3  s/p aspirin  chewable 81 milliGRAM(s); dexAMETHasone     Tablet 6 milliGRAM(s); ketorolac   Injectable 15 milliGRAM(s); losartan 50 milliGRAM(s); potassium chloride   Powder 40 milliEquivalent(s); potassium chloride  10 mEq/100 mL IVPB 10 milliEquivalent(s) (09 Mar 2025 22:47)      PAST MEDICAL & SURGICAL HISTORY:  HTN (hypertension)      HLD (hyperlipidemia)      Glaucoma      Calculus of bile duct      H/O cerebellar ataxia      Vertigo  h/o      Migraine      Renal cyst      Fatty liver      GERD (gastroesophageal reflux disease)      History of cholecystectomy      S/P knee replacement  Right knee      H/O eye surgery          MEDICATIONS  (STANDING):  amLODIPine   Tablet 5 milliGRAM(s) Oral daily  amoxicillin  875 milliGRAM(s)/clavulanate 1 Tablet(s) Oral every 12 hours  atorvastatin 20 milliGRAM(s) Oral at bedtime  donepezil 10 milliGRAM(s) Oral at bedtime  enoxaparin Injectable 40 milliGRAM(s) SubCutaneous every 24 hours  fluticasone propionate 50 MICROgram(s)/spray Nasal Spray 1 Spray(s) Both Nostrils two times a day  hydrochlorothiazide 25 milliGRAM(s) Oral daily  hydrocortisone 1% Cream 1 Application(s) Topical daily  latanoprost 0.005% Ophthalmic Solution 1 Drop(s) Both EYES at bedtime  losartan 100 milliGRAM(s) Oral daily  pantoprazole    Tablet 40 milliGRAM(s) Oral two times a day  QUEtiapine 50 milliGRAM(s) Oral at bedtime  triamcinolone 0.1% Cream 1 Application(s) Topical two times a day    MEDICATIONS  (PRN):  acetaminophen     Tablet .. 650 milliGRAM(s) Oral every 6 hours PRN Temp greater or equal to 38C (100.4F), Mild Pain (1 - 3)  benzocaine/menthol Lozenge 1 Lozenge Oral every 3 hours PRN Sore Throat  ondansetron Injectable 4 milliGRAM(s) IV Push every 8 hours PRN Nausea and/or Vomiting      FAMILY HISTORY:No hx of cad      SOCIAL HISTORY:    [x ] Non-smoker    [x ] Alcohol-denies    Allergies    No Known Allergies    Intolerances    	    REVIEW OF SYSTEMS:  CONSTITUTIONAL: No fever, weight loss, or fatigue  EYES: No eye pain, visual disturbances, or discharge  ENT:  No difficulty hearing, tinnitus, vertigo; No sinus or throat pain  NECK: No pain or stiffness  RESPIRATORY: No cough, wheezing, chills or hemoptysis; No Shortness of Breath  CARDIOVASCULAR: No chest pain, palpitations, passing out, dizziness, or leg swelling  GASTROINTESTINAL: + abdominal or epigastric pain. No nausea, vomiting, or hematemesis; No diarrhea or constipation. No melena or hematochezia.  GENITOURINARY: No dysuria, frequency, hematuria, or incontinence  NEUROLOGICAL: No headaches, memory loss, loss of strength, numbness, or tremors  SKIN: No itching, burning, rashes, or lesions   LYMPH Nodes: No enlarged glands  ENDOCRINE: No heat or cold intolerance; No hair loss  MUSCULOSKELETAL: No joint pain or swelling; No muscle, back, or extremity pain  PSYCHIATRIC: No depression, anxiety, mood swings, or difficulty sleeping  HEME/LYMPH: No easy bruising, or bleeding gums  ALLERGY AND IMMUNOLOGIC: No hives or eczema	        PHYSICAL EXAM:  T(C): 36.5 (03-10-25 @ 05:26), Max: 36.6 (25 @ 17:47)  HR: 77 (03-10-25 @ 07:35) (66 - 80)  BP: 148/68 (03-10-25 @ 07:35) (148/68 - 215/85)  RR: 16 (03-10-25 @ 07:35) (16 - 24)  SpO2: 98% (03-10-25 @ 07:35) (97% - 100%)  Wt(kg): --  I&O's Summary      Appearance: Normal	  HEENT:   Normal oral mucosa, PERRL, EOMI	  Lymphatic: No lymphadenopathy  Cardiovascular: Normal S1 S2, No JVD, No murmurs, No edema  Respiratory: Lungs clear to auscultation	  Psychiatry: A & O x 3, Mood & affect appropriate  Gastrointestinal:  Soft, Non-tender, + BS	  Skin: No rashes, No ecchymoses, No cyanosis	  Neurologic: Non-focal  Extremities: Normal range of motion, No clubbing, cyanosis or edema  Vascular: Peripheral pulses palpable 2+ bilaterally     ECG:  	nsr,lvh  	  	  LABS:	 	  Troponin I, High Sensitivity Result: 228.9 ng/L (03-10-25 @ 05:00)  Troponin I, High Sensitivity Result: 242.9 ng/L (25 @ 22:43)  Troponin I, High Sensitivity Result: 228.3 ng/L (25 @ 18:31)                          13.0   6.38  )-----------( 300      ( 10 Mar 2025 05:00 )             39.1     03-10    134[L]  |  106  |  9   ----------------------------<  172[H]  4.5   |  22  |  0.93    Ca    9.2      10 Mar 2025 05:00  Phos  1.2     03-09  Mg     1.7     03-09    TPro  7.3  /  Alb  3.5  /  TBili  0.3  /  DBili  x   /  AST  13  /  ALT  18  /  AlkPhos  80  03-10    proBNP:   Lipid Profile: Cholesterol 213  LDL --    TG 59  ldl calc 84  Ratio --    < from: Xray Chest 2 Views PA/Lat (25 @ 19:07) >  ACC: [id number] EXAM:  XR CHEST PA LAT 2V   ORDERED BY: JOSE A MARTINEZ     PROCEDURE DATE:  2025          INTERPRETATION:  TECHNIQUE: 2 views of the chest.    COMPARISON: None    CLINICAL HISTORY: uri    FINDINGS:    Frontal and lateral views of the chest demonstrates the lungs to be   clear. The cardiomediastinal silhouette is unremarkable. No acute osseous   abnormalities.    IMPRESSION:    No acute cardiopulmonary disease process.    --- End of Report ---            ANAYELI COATES MD; Attending Radiologist  This document has been electronically signed. Mar  9 2025 11:32PM    < end of copied text >  < from: US Duplex Venous Lower Ext Ltd, Left (24 @ 12:27) >  ACC: [id number] EXAM:  US DPLX LWR EXT VEINS LTD LT   ORDERED BY: ANNE CASTAÑEDA     PROCEDURE DATE:  2024          INTERPRETATION:  CLINICAL INFORMATION: Left leg edema.    COMPARISON: Left lower extremity venous Doppler 2022    TECHNIQUE: Duplex sonography of the LEFT LOWER extremity veins with color   and spectral Doppler, with and without compression.    FINDINGS:    There is normal compressibility of the left common femoral, femoral and   popliteal veins.  The contralateral common femoral vein is patent.  Doppler examination shows normal spontaneous and phasic flow.    No calf vein thrombosis is detected.    IMPRESSION:  No evidence of left lower extremity deep venous thrombosis.            --- End of Report ---            JOYA HOUSER MD; Attending Radiologist  This document has been electronically signed. 2024 12:38PM    < end of copied text >  < from: CT Abdomen and Pelvis w/ IV Cont (24 @ 15:58) >  ACC: [id number] EXAM:  CT ABDOMEN AND PELVIS IC   ORDERED BY: CARYN KU     PROCEDURE DATE:  2024          INTERPRETATION:  CLINICAL INFORMATION: 89 years  Female with abd pain.    COMPARISON: None.    CONTRAST/COMPLICATIONS:  IV Contrast: Omnipaque 350  90 cc administered   10 cc discarded  Oral Contrast: NONE  Complications: None reported at time of study completion    PROCEDURE:  CT of the Abdomen and Pelvis was performed.  Sagittal and coronal reformats were performed.    FINDINGS:  LOWER CHEST: Aortic and coronary atherosclerosis.    LIVER: Steatosis.  BILE DUCTS: Mild intrahepatic duct distention post cholecystectomy.   Common duct 7 mm.  GALLBLADDER: Cholecystectomy.  SPLEEN: Within normal limits.  PANCREAS: Within normal limits.  ADRENALS: Stable left adrenal thickening. Normal right adrenal gland.  KIDNEYS/URETERS: 3.8 cm left upper pole cyst. No hydronephrosis or   urolithiasis. Symmetrical nephrograms.    BLADDER: Decompressed.  REPRODUCTIVE ORGANS: Calcified fibroids measuring up to 1.5 cm.    BOWEL: No bowel obstruction. Appendix is normal.. Extensive pancolonic   diverticulosis without diverticulitis. Associated sigmoid colonic wall   thickening.  PERITONEUM/RETROPERITONEUM: Within normal limits.  VESSELS: Atherosclerotic changes.  LYMPH NODES: No lymphadenopathy.  ABDOMINAL WALL: Within normal limits.  BONES: Mild L2 superior endplate compression deformity, new compared with   2019.    IMPRESSION:  No acute intra-abdominal pathology.    Colonic diverticulosis without diverticulitis.  Of associated sigmoid colonic wall thickening, follow-up colonoscopy is   advised.    Mild L2 superior endplate compression deformity, age indeterminate but   new compared to 2019.    Hepatic steatosis.    Other chronic findings as above.        --- End of Report ---            EUGENE CUI MD; Attending Radiologist  This document has been electronically signed. 2024  4:28PM    < end of copied text >  < from: TTE W or WO Ultrasound Enhancing Agent (24 @ 10:16) >  CONCLUSIONS:      1. Left ventricular systolic function is normal with an ejection fraction visually estimated at 65 to 70 %.   2. There is mild (grade 1) left ventricular diastolic dysfunction.   3. Normal right ventricular cavity size and normal wall thickness,.   4. Moderate aortic stenosis.   5. Mild mitral valve stenosis.   6. No pericardial effusion seen.   7. Compared to the transthoracic echocardiogram performed on 2023, moderate AS is noted in today's study.    < end of copied text >         PULMONARY CONSULT NOTE      JUDE BILLY  MRN-[id number]    History of Present Illness:  Reason for Admission: HTN urgency, Hypokalemia, URI, Type 2 MI  History of Present Illness:   89y F, from home, ambulates with cane/walker, with PMH of HTN, HLD, Breast CA s/p R mastectomy, Glaucoma, Calculus of bile duct s/p ERCP, Vertigo, Migraine, Renal cyst, Fatty liver, and GERD p/w several days of sore throat, nasal congestion, R ear pain, dry cough, and epigastric pain. Reports decreased appetite and decreased PO intake over the last 3 days. Patient states she did not take her home BP medication prior to coming to ED. Reports some nausea this AM but denies vomiting. Granddaughter at bedside reports that patient has very sensitive stomach and has gone over list of foods to avoid with her PCP. Patient also reports frequent burping. Complains of sneezing and subsequent flatus whenever she sneezes recently. Denies fecal incontinence. Reports normal and regular BMs with last BM yesterday afternoon. Complains of painful itchy rash on scalp that frequently flares when sick or under stress. Follows with dermatologist who has prescribed multiple creams and shampoos with limited efficacy. Patient reports fall 3 days prior to admission and injury to R knee which is the knee she had replaced. States that since fall walking has been more difficult. Denies fever, chills, headache, dizziness, chest pain, palpitations, shortness of breath, vomiting, diarrhea, constipation, and dysuria.        In the ED:  T(F): , Max: 97.9 (17:47); HR:  (67 - 70); BP:  (189/79 - 215/85); RR:  (20 - 24); SpO2:  (99% - 100%)  WBC:9.14, Hb.5, PLT:276  Na:137, K:2.7, Cl:103, HCO3:25, BUN:10, sCr:0.81  AST:16, ALT:18, ALP:75, Tbili:0.4, Lipase:70   Troponin:228.3  s/p aspirin  chewable 81 milliGRAM(s); dexAMETHasone     Tablet 6 milliGRAM(s); ketorolac   Injectable 15 milliGRAM(s); losartan 50 milliGRAM(s); potassium chloride   Powder 40     HISTORY OF PRESENT ILLNESS: As Above. Awake, alert, comfortable in bed in NAD    MEDICATIONS  (STANDING):  amLODIPine   Tablet 5 milliGRAM(s) Oral daily  amoxicillin  875 milliGRAM(s)/clavulanate 1 Tablet(s) Oral every 12 hours  atorvastatin 20 milliGRAM(s) Oral at bedtime  donepezil 10 milliGRAM(s) Oral at bedtime  enoxaparin Injectable 40 milliGRAM(s) SubCutaneous every 24 hours  fluticasone propionate 50 MICROgram(s)/spray Nasal Spray 1 Spray(s) Both Nostrils two times a day  hydrochlorothiazide 25 milliGRAM(s) Oral daily  hydrocortisone 1% Cream 1 Application(s) Topical daily  latanoprost 0.005% Ophthalmic Solution 1 Drop(s) Both EYES at bedtime  losartan 100 milliGRAM(s) Oral daily  pantoprazole    Tablet 40 milliGRAM(s) Oral two times a day  QUEtiapine 50 milliGRAM(s) Oral at bedtime  triamcinolone 0.1% Cream 1 Application(s) Topical two times a day      MEDICATIONS  (PRN):  acetaminophen     Tablet .. 650 milliGRAM(s) Oral every 6 hours PRN Temp greater or equal to 38C (100.4F), Mild Pain (1 - 3)  benzocaine/menthol Lozenge 1 Lozenge Oral every 3 hours PRN Sore Throat  ondansetron Injectable 4 milliGRAM(s) IV Push every 8 hours PRN Nausea and/or Vomiting      Allergies    No Known Allergies    Intolerances        PAST MEDICAL & SURGICAL HISTORY:  HTN (hypertension)      HLD (hyperlipidemia)      Glaucoma      Calculus of bile duct      H/O cerebellar ataxia      Vertigo  h/o      Migraine      Renal cyst      Fatty liver      GERD (gastroesophageal reflux disease)      History of cholecystectomy      S/P knee replacement  Right knee      H/O eye surgery          FAMILY HISTORY:      SOCIAL HISTORY  Smoking History:     REVIEW OF SYSTEMS:    CONSTITUTIONAL:  No fevers, chills, sweats    HEENT:  Eyes:  No diplopia or blurred vision. ENT:  No earache, sore throat or runny nose.    CARDIOVASCULAR:  No pressure, squeezing, tightness, or heaviness about the chest; no palpitations.    RESPIRATORY:  Per HPI    GASTROINTESTINAL:  No abdominal pain, nausea, vomiting or diarrhea.    GENITOURINARY:  No dysuria, frequency or urgency.    NEUROLOGIC:  No paresthesias, fasciculations, seizures or weakness.    PSYCHIATRIC:  No disorder of thought or mood.    Vital Signs Last 24 Hrs  T(C): 36.5 (10 Mar 2025 05:26), Max: 36.6 (09 Mar 2025 17:47)  T(F): 97.7 (10 Mar 2025 05:26), Max: 97.9 (09 Mar 2025 17:47)  HR: 77 (10 Mar 2025 07:35) (66 - 80)  BP: 148/68 (10 Mar 2025 07:35) (148/68 - 215/85)  BP(mean): 95 (10 Mar 2025 05:) (91 - 95)  RR: 16 (10 Mar 2025 07:35) (16 - 24)  SpO2: 98% (10 Mar 2025 07:35) (97% - 100%)    Parameters below as of 10 Mar 2025 07:35  Patient On (Oxygen Delivery Method): room air      I&O's Detail      PHYSICAL EXAMINATION:    GENERAL: The patient is a well-developed, well-nourished _____in no apparent distress.     HEENT: Head is normocephalic and atraumatic. Extraocular muscles are intact. Mucous membranes are moist.     NECK: Supple.     LUNGS: Clear to auscultation without wheezing, rales, or rhonchi. Respirations unlabored    HEART: Regular rate and rhythm without murmur.    ABDOMEN: Soft, nontender, and nondistended.  No hepatosplenomegaly is noted.    EXTREMITIES: Without any cyanosis, clubbing, rash, lesions or edema.    NEUROLOGIC: Grossly intact.      LABS:                        13.0   6.38  )-----------( 300      ( 10 Mar 2025 05:00 )             39.1     03-10    134[L]  |  106  |  9   ----------------------------<  172[H]  4.5   |  22  |  0.93    Ca    9.2      10 Mar 2025 05:00  Phos  1.2     03-09  Mg     1.7     03-09    TPro  7.3  /  Alb  3.5  /  TBili  0.3  /  DBili  x   /  AST  13  /  ALT  18  /  AlkPhos  80  03-10      Urinalysis Basic - ( 10 Mar 2025 05:00 )    Color: x / Appearance: x / SG: x / pH: x  Gluc: 172 mg/dL / Ketone: x  / Bili: x / Urobili: x   Blood: x / Protein: x / Nitrite: x   Leuk Esterase: x / RBC: x / WBC x   Sq Epi: x / Non Sq Epi: x / Bacteria: x                      MICROBIOLOGY:  FluA/FluB/RSV/COVID PCR (25 @ 18:02)   Resp Syn Virus Result: NotDetec  RADIOLOGY & ADDITIONAL STUDIES:    CXR:  < from: Xray Chest 2 Views PA/Lat (25 @ 19:07) >  IMPRESSION:    No acute cardiopulmonary disease process.      < end of copied text >    Ct scan chest;    ekg;    echo: [  ] STAT REQUEST              [ X ] ROUTINE REQUEST    Patient is a 89 year old female with epigastric abdominal pain. GI consulted to evaluate.        HPI:  89 year old female from home, ambulates with cane/walker, with past medical history significant for HTN, Hyperlipidemia, Breast CA s/p R mastectomy, Glaucoma, Calculus of bile duct s/p ERCP, Vertigo, Migraine headaches, Renal cyst, Fatty liver, and GERD presented to the emergency room with 5 days history of progressively worsening intermittent, 7/10 intensity burning epigastric pain radiating up to her chest associated with burping, bloating, nausea and poor po intake due to loss of appetite. Patient also c/o sore throat, nasal congestion, Right ear pain, and dry cough. Patient's granddaughter reports that patient has very sensitive stomach. Patient denies vomiting, hematemesis, hematochezia, melena, fever, chills, chest pain, SOB, hematuria, dysuria or diarrhea.       PAIN MANAGEMENT:  Pain Scale:                 7/10  Pain Location:  Epigastric burning abdominal pain    Prior Colonoscopy:  Unknown      PAST MEDICAL HISTORY    HTN (hypertension)    Hyperlipidemia     Glaucoma     Calculus of bile duct    Cerebellar ataxia    Vertigo    Migraine headache    Renal cyst    Fatty liver    GERD (gastroesophageal reflux disease)    Breast cancer        PAST SURGICAL HISTORY    Cholecystectomy     Knee replacement    Eye surgery    Right mastectomy        Allergies    No Known Allergies    Intolerances  None         MEDICATIONS  (STANDING):  amLODIPine   Tablet 5 milliGRAM(s) Oral daily  amoxicillin  875 milliGRAM(s)/clavulanate 1 Tablet(s) Oral every 12 hours  atorvastatin 20 milliGRAM(s) Oral at bedtime  donepezil 10 milliGRAM(s) Oral at bedtime  enoxaparin Injectable 40 milliGRAM(s) SubCutaneous every 24 hours  fluticasone propionate 50 MICROgram(s)/spray Nasal Spray 1 Spray(s) Both Nostrils two times a day  hydrochlorothiazide 25 milliGRAM(s) Oral daily  hydrocortisone 1% Cream 1 Application(s) Topical daily  latanoprost 0.005% Ophthalmic Solution 1 Drop(s) Both EYES at bedtime  losartan 100 milliGRAM(s) Oral daily  pantoprazole    Tablet 40 milliGRAM(s) Oral two times a day  QUEtiapine 50 milliGRAM(s) Oral at bedtime  triamcinolone 0.1% Cream 1 Application(s) Topical two times a day    MEDICATIONS  (PRN):  acetaminophen     Tablet .. 650 milliGRAM(s) Oral every 6 hours PRN Temp greater or equal to 38C (100.4F), Mild Pain (1 - 3)  benzocaine/menthol Lozenge 1 Lozenge Oral every 3 hours PRN Sore Throat  ondansetron Injectable 4 milliGRAM(s) IV Push every 8 hours PRN Nausea and/or Vomiting      SOCIAL HISTORY  Advanced Directives:       [ X ] Full Code       [  ] DNR  Marital Status:         [  ] M      [ X ] S      [  ] D       [  ] W  Children:       [ X ] Yes      [  ] No  Occupation:        [  ] Employed       [  X] Unemployed       [  ] Retired  Diet:       [  X] Regular       [  ] PEG feeding          [  ] NG tube feeding  Drug Use:           [ X ] No          [  ] Yes  Alcohol:           [X  ] No             [  ] Yes (socially)         [  ] Yes (chronic)  Tobacco:           [  ] Yes           [ X ] No      FAMILY HISTORY  [ X ] Heart Disease            [ X ] Diabetes             [ X ] HTN             [  ] Colon Cancer             [  ] Stomach Cancer              [  ] Pancreatic Cancer      VITAL SIGNS   Vital Signs Last 24 Hrs  T(C): 36.5 (03-10-25 @ 05:26), Max: 36.6 (03-09-25 @ 17:47)  T(F): 97.7 (03-10-25 @ 05:26), Max: 97.9 (03-09-25 @ 17:47)  HR: 77 (03-10-25 @ 07:35) (66 - 80)  BP: 148/68 (03-10-25 @ 07:35) (148/68 - 215/85)  BP(mean): 95 (03-10-25 @ 05:26) (91 - 95)  RR: 16 (03-10-25 @ 07:35) (16 - 24)  SpO2: 98% (03-10-25 @ 07:35) (97% - 100%)  Daily Height in cm: 152.4 (09 Mar 2025 17:47)           CBC Full  -  ( 10 Mar 2025 05:00 )  WBC Count : 6.38 K/uL  RBC Count : 4.29 M/uL  Hemoglobin : 13.0 g/dL  Hematocrit : 39.1 %  Platelet Count - Automated : 300 K/uL  Mean Cell Volume : 91.1 fl  Mean Cell Hemoglobin : 30.3 pg  Mean Cell Hemoglobin Concentration : 33.2 g/dL  Auto Neutrophil # : x  Auto Lymphocyte # : x  Auto Monocyte # : x  Auto Eosinophil # : x  Auto Basophil # : x  Auto Neutrophil % : x  Auto Lymphocyte % : x  Auto Monocyte % : x  Auto Eosinophil % : x  Auto Basophil % : x      03-10    134[L]  |  106  |  9   ----------------------------<  172[H]  4.5   |  22  |  0.93    Ca    9.2      10 Mar 2025 05:00  Phos  1.2     03-09  Mg     1.7     03-09    TPro  7.3  /  Alb  3.5  /  TBili  0.3  /  DBili  x   /  AST  13  /  ALT  18  /  AlkPhos  80  03-10      Lipase: 70 U/L (03-09 @ 18:31)     Urinalysis + Microscopic Examination (06.07.24 @ 12:15)   pH Urine: 6.0  Urine Appearance: Clear  Color: Yellow  Specific Gravity: 1.021  Protein, Urine: Trace mg/dL  Glucose Qualitative, Urine: Negative mg/dL  Ketone - Urine: Trace mg/dL  Blood, Urine: Negative  Bilirubin: Negative  Urobilinogen: 1.0 mg/dL  Leukocyte Esterase Concentration: Negative  Nitrite: Negative  White Blood Cell - Urine: 2 /HPF  Red Blood Cell - Urine: 0 /HPF  Bacteria: Occasional /HPF  Hyaline Casts: Present  Squamous Epithelial Cells: Present    ECG (03.09.20 @ 10:20)      Ventricular Rate 75 BPM    Atrial Rate 75 BPM    P-R Interval 170 ms    QRS Duration 74 ms    Q-T Interval 386 ms    QTC Calculation(Bezet) 431 ms    P Axis 28 degrees    R Axis 18 degrees    T Axis 61 degrees    Diagnosis Line Normal sinus rhythm  Normal ECG           RADIOLOGY/IMAGING                  ACC: [id number] EXAM:  CT ABDOMEN AND PELVIS IC   ORDERED BY: CARYN KU     PROCEDURE DATE:  06/07/2024          INTERPRETATION:  CLINICAL INFORMATION: 89 years  Female with abd pain.    COMPARISON: None.    CONTRAST/COMPLICATIONS:  IV Contrast: Omnipaque 350  90 cc administered   10 cc discarded  Oral Contrast: NONE  Complications: None reported at time of study completion    PROCEDURE:  CT of the Abdomen and Pelvis was performed.  Sagittal and coronal reformats were performed.    FINDINGS:  LOWER CHEST: Aortic and coronary atherosclerosis.    LIVER: Steatosis.  BILE DUCTS: Mild intrahepatic duct distention post cholecystectomy.   Common duct 7 mm.  GALLBLADDER: Cholecystectomy.  SPLEEN: Within normal limits.  PANCREAS: Within normal limits.  ADRENALS: Stable left adrenal thickening. Normal right adrenal gland.  KIDNEYS/URETERS: 3.8 cm left upper pole cyst. No hydronephrosis or   urolithiasis. Symmetrical nephrograms.    BLADDER: Decompressed.  REPRODUCTIVE ORGANS: Calcified fibroids measuring up to 1.5 cm.    BOWEL: No bowel obstruction. Appendix is normal.. Extensive pancolonic   diverticulosis without diverticulitis. Associated sigmoid colonic wall   thickening.  PERITONEUM/RETROPERITONEUM: Within normal limits.  VESSELS: Atherosclerotic changes.  LYMPH NODES: No lymphadenopathy.  ABDOMINAL WALL: Within normal limits.  BONES: Mild L2 superior endplate compression deformity, new compared with   2019.    IMPRESSION:  No acute intra-abdominal pathology.    Colonic diverticulosis without diverticulitis.  Of associated sigmoid colonic wall thickening, follow-up colonoscopy is   advised.    Mild L2 superior endplate compression deformity, age indeterminate but   new compared to 2019.    Hepatic steatosis.        ACC: [id number] EXAM:  XR CHEST PA LAT 2V   ORDERED BY: JOSE A MARTINEZ     PROCEDURE DATE:  03/09/2025          INTERPRETATION:  TECHNIQUE: 2 views of the chest.    COMPARISON: None    CLINICAL HISTORY: uri    FINDINGS:    Frontal and lateral views of the chest demonstrates the lungs to be   clear. The cardiomediastinal silhouette is unremarkable. No acute osseous   abnormalities.    IMPRESSION:    No acute cardiopulmonary disease process.   Spouse/Significant other

## 2025-03-24 ENCOUNTER — TRANSCRIPTION ENCOUNTER (OUTPATIENT)
Age: 70
End: 2025-03-24

## 2025-03-24 VITALS — BODY MASS INDEX: 22.66 KG/M2 | WEIGHT: 136 LBS | HEIGHT: 65 IN

## 2025-03-25 ENCOUNTER — APPOINTMENT (OUTPATIENT)
Dept: ORTHOPEDIC SURGERY | Facility: CLINIC | Age: 70
End: 2025-03-25

## 2025-04-11 ENCOUNTER — APPOINTMENT (OUTPATIENT)
Dept: NEUROLOGY | Facility: CLINIC | Age: 70
End: 2025-04-11

## 2025-04-11 VITALS
TEMPERATURE: 97.2 F | BODY MASS INDEX: 22.56 KG/M2 | WEIGHT: 135.38 LBS | DIASTOLIC BLOOD PRESSURE: 81 MMHG | OXYGEN SATURATION: 100 % | HEIGHT: 65 IN | HEART RATE: 65 BPM | SYSTOLIC BLOOD PRESSURE: 126 MMHG

## 2025-04-11 PROCEDURE — 99213 OFFICE O/P EST LOW 20 MIN: CPT

## 2025-05-09 ENCOUNTER — NON-APPOINTMENT (OUTPATIENT)
Age: 70
End: 2025-05-09

## 2025-05-09 ENCOUNTER — TRANSCRIPTION ENCOUNTER (OUTPATIENT)
Age: 70
End: 2025-05-09

## 2025-05-10 ENCOUNTER — INPATIENT (INPATIENT)
Facility: HOSPITAL | Age: 70
LOS: 2 days | Discharge: HOME | DRG: 93 | End: 2025-05-13
Attending: INTERNAL MEDICINE | Admitting: INTERNAL MEDICINE
Payer: MEDICARE

## 2025-05-10 VITALS
HEIGHT: 65 IN | RESPIRATION RATE: 18 BRPM | DIASTOLIC BLOOD PRESSURE: 75 MMHG | SYSTOLIC BLOOD PRESSURE: 124 MMHG | OXYGEN SATURATION: 99 % | TEMPERATURE: 98 F | HEART RATE: 61 BPM | WEIGHT: 134.92 LBS

## 2025-05-10 DIAGNOSIS — I63.9 CEREBRAL INFARCTION, UNSPECIFIED: ICD-10-CM

## 2025-05-10 DIAGNOSIS — Z98.89 OTHER SPECIFIED POSTPROCEDURAL STATES: Chronic | ICD-10-CM

## 2025-05-10 DIAGNOSIS — R27.0 ATAXIA, UNSPECIFIED: ICD-10-CM

## 2025-05-10 DIAGNOSIS — E78.5 HYPERLIPIDEMIA, UNSPECIFIED: ICD-10-CM

## 2025-05-10 DIAGNOSIS — R56.9 UNSPECIFIED CONVULSIONS: ICD-10-CM

## 2025-05-10 DIAGNOSIS — R26.9 UNSPECIFIED ABNORMALITIES OF GAIT AND MOBILITY: ICD-10-CM

## 2025-05-10 DIAGNOSIS — Z29.9 ENCOUNTER FOR PROPHYLACTIC MEASURES, UNSPECIFIED: ICD-10-CM

## 2025-05-10 DIAGNOSIS — E04.2 NONTOXIC MULTINODULAR GOITER: ICD-10-CM

## 2025-05-10 LAB
ALBUMIN SERPL ELPH-MCNC: 3.6 G/DL — SIGNIFICANT CHANGE UP (ref 3.5–5)
ALP SERPL-CCNC: 72 U/L — SIGNIFICANT CHANGE UP (ref 40–120)
ALT FLD-CCNC: 35 U/L DA — SIGNIFICANT CHANGE UP (ref 10–60)
ANION GAP SERPL CALC-SCNC: 4 MMOL/L — LOW (ref 5–17)
APPEARANCE UR: CLEAR — SIGNIFICANT CHANGE UP
APTT BLD: 38.1 SEC — HIGH (ref 26.1–36.8)
AST SERPL-CCNC: 22 U/L — SIGNIFICANT CHANGE UP (ref 10–40)
BASOPHILS # BLD AUTO: 0.03 K/UL — SIGNIFICANT CHANGE UP (ref 0–0.2)
BASOPHILS NFR BLD AUTO: 0.4 % — SIGNIFICANT CHANGE UP (ref 0–2)
BILIRUB SERPL-MCNC: 0.6 MG/DL — SIGNIFICANT CHANGE UP (ref 0.2–1.2)
BILIRUB UR-MCNC: NEGATIVE — SIGNIFICANT CHANGE UP
BUN SERPL-MCNC: 19 MG/DL — HIGH (ref 7–18)
CALCIUM SERPL-MCNC: 9.4 MG/DL — SIGNIFICANT CHANGE UP (ref 8.4–10.5)
CHLORIDE SERPL-SCNC: 110 MMOL/L — HIGH (ref 96–108)
CO2 SERPL-SCNC: 24 MMOL/L — SIGNIFICANT CHANGE UP (ref 22–31)
COLOR SPEC: YELLOW — SIGNIFICANT CHANGE UP
CREAT SERPL-MCNC: 0.74 MG/DL — SIGNIFICANT CHANGE UP (ref 0.5–1.3)
DIFF PNL FLD: NEGATIVE — SIGNIFICANT CHANGE UP
EGFR: 88 ML/MIN/1.73M2 — SIGNIFICANT CHANGE UP
EGFR: 88 ML/MIN/1.73M2 — SIGNIFICANT CHANGE UP
EOSINOPHIL # BLD AUTO: 0.24 K/UL — SIGNIFICANT CHANGE UP (ref 0–0.5)
EOSINOPHIL NFR BLD AUTO: 3.5 % — SIGNIFICANT CHANGE UP (ref 0–6)
GLUCOSE SERPL-MCNC: 81 MG/DL — SIGNIFICANT CHANGE UP (ref 70–99)
GLUCOSE UR QL: NEGATIVE MG/DL — SIGNIFICANT CHANGE UP
HCT VFR BLD CALC: 40.9 % — SIGNIFICANT CHANGE UP (ref 34.5–45)
HGB BLD-MCNC: 14.6 G/DL — SIGNIFICANT CHANGE UP (ref 11.5–15.5)
IMM GRANULOCYTES NFR BLD AUTO: 0.3 % — SIGNIFICANT CHANGE UP (ref 0–0.9)
INR BLD: 1.05 RATIO — SIGNIFICANT CHANGE UP (ref 0.85–1.16)
KETONES UR-MCNC: ABNORMAL MG/DL
LEUKOCYTE ESTERASE UR-ACNC: NEGATIVE — SIGNIFICANT CHANGE UP
LYMPHOCYTES # BLD AUTO: 3.17 K/UL — SIGNIFICANT CHANGE UP (ref 1–3.3)
LYMPHOCYTES # BLD AUTO: 46.4 % — HIGH (ref 13–44)
MAGNESIUM SERPL-MCNC: 2.1 MG/DL — SIGNIFICANT CHANGE UP (ref 1.6–2.6)
MCHC RBC-ENTMCNC: 28.4 PG — SIGNIFICANT CHANGE UP (ref 27–34)
MCHC RBC-ENTMCNC: 35.7 G/DL — SIGNIFICANT CHANGE UP (ref 32–36)
MCV RBC AUTO: 79.6 FL — LOW (ref 80–100)
MONOCYTES # BLD AUTO: 0.41 K/UL — SIGNIFICANT CHANGE UP (ref 0–0.9)
MONOCYTES NFR BLD AUTO: 6 % — SIGNIFICANT CHANGE UP (ref 2–14)
NEUTROPHILS # BLD AUTO: 2.96 K/UL — SIGNIFICANT CHANGE UP (ref 1.8–7.4)
NEUTROPHILS NFR BLD AUTO: 43.4 % — SIGNIFICANT CHANGE UP (ref 43–77)
NITRITE UR-MCNC: NEGATIVE — SIGNIFICANT CHANGE UP
NRBC BLD AUTO-RTO: 0 /100 WBCS — SIGNIFICANT CHANGE UP (ref 0–0)
NT-PROBNP SERPL-SCNC: 57 PG/ML — SIGNIFICANT CHANGE UP (ref 0–125)
PH UR: 5 — SIGNIFICANT CHANGE UP (ref 5–8)
PLATELET # BLD AUTO: 280 K/UL — SIGNIFICANT CHANGE UP (ref 150–400)
POTASSIUM SERPL-MCNC: 4.3 MMOL/L — SIGNIFICANT CHANGE UP (ref 3.5–5.3)
POTASSIUM SERPL-SCNC: 4.3 MMOL/L — SIGNIFICANT CHANGE UP (ref 3.5–5.3)
PROT SERPL-MCNC: 7.3 G/DL — SIGNIFICANT CHANGE UP (ref 6–8.3)
PROT UR-MCNC: NEGATIVE MG/DL — SIGNIFICANT CHANGE UP
PROTHROM AB SERPL-ACNC: 12.3 SEC — SIGNIFICANT CHANGE UP (ref 9.9–13.4)
RBC # BLD: 5.14 M/UL — SIGNIFICANT CHANGE UP (ref 3.8–5.2)
RBC # FLD: 13.8 % — SIGNIFICANT CHANGE UP (ref 10.3–14.5)
SODIUM SERPL-SCNC: 138 MMOL/L — SIGNIFICANT CHANGE UP (ref 135–145)
SP GR SPEC: 1.02 — SIGNIFICANT CHANGE UP (ref 1–1.03)
TROPONIN I, HIGH SENSITIVITY RESULT: 6.2 NG/L — SIGNIFICANT CHANGE UP
UROBILINOGEN FLD QL: 0.2 MG/DL — SIGNIFICANT CHANGE UP (ref 0.2–1)
WBC # BLD: 6.83 K/UL — SIGNIFICANT CHANGE UP (ref 3.8–10.5)
WBC # FLD AUTO: 6.83 K/UL — SIGNIFICANT CHANGE UP (ref 3.8–10.5)

## 2025-05-10 PROCEDURE — 70496 CT ANGIOGRAPHY HEAD: CPT | Mod: 26

## 2025-05-10 PROCEDURE — 70498 CT ANGIOGRAPHY NECK: CPT | Mod: 26

## 2025-05-10 PROCEDURE — 99285 EMERGENCY DEPT VISIT HI MDM: CPT

## 2025-05-10 RX ORDER — MAGNESIUM, ALUMINUM HYDROXIDE 200-200 MG
30 TABLET,CHEWABLE ORAL EVERY 4 HOURS
Refills: 0 | Status: DISCONTINUED | OUTPATIENT
Start: 2025-05-10 | End: 2025-05-11

## 2025-05-10 RX ORDER — DORZOLAMIDE 20 MG/ML
1 SOLUTION/ DROPS OPHTHALMIC
Refills: 0 | Status: DISCONTINUED | OUTPATIENT
Start: 2025-05-10 | End: 2025-05-13

## 2025-05-10 RX ORDER — MELATONIN 5 MG
3 TABLET ORAL AT BEDTIME
Refills: 0 | Status: DISCONTINUED | OUTPATIENT
Start: 2025-05-10 | End: 2025-05-11

## 2025-05-10 RX ORDER — LATANOPROST PF 0.05 MG/ML
1 SOLUTION/ DROPS OPHTHALMIC
Refills: 0 | DISCHARGE

## 2025-05-10 RX ORDER — LEVETIRACETAM 10 MG/ML
1500 INJECTION, SOLUTION INTRAVENOUS
Refills: 0 | Status: DISCONTINUED | OUTPATIENT
Start: 2025-05-10 | End: 2025-05-10

## 2025-05-10 RX ORDER — CLOPIDOGREL BISULFATE 75 MG/1
75 TABLET, FILM COATED ORAL DAILY
Refills: 0 | Status: DISCONTINUED | OUTPATIENT
Start: 2025-05-10 | End: 2025-05-13

## 2025-05-10 RX ORDER — DORZOLAMIDE 20 MG/ML
1 SOLUTION/ DROPS OPHTHALMIC
Refills: 0 | DISCHARGE

## 2025-05-10 RX ORDER — LATANOPROST PF 0.05 MG/ML
1 SOLUTION/ DROPS OPHTHALMIC AT BEDTIME
Refills: 0 | Status: DISCONTINUED | OUTPATIENT
Start: 2025-05-10 | End: 2025-05-13

## 2025-05-10 RX ORDER — LEVETIRACETAM 10 MG/ML
2 INJECTION, SOLUTION INTRAVENOUS
Refills: 0 | DISCHARGE

## 2025-05-10 RX ORDER — LEVETIRACETAM 10 MG/ML
1500 INJECTION, SOLUTION INTRAVENOUS
Refills: 0 | Status: DISCONTINUED | OUTPATIENT
Start: 2025-05-10 | End: 2025-05-13

## 2025-05-10 RX ORDER — ONDANSETRON HCL/PF 4 MG/2 ML
4 VIAL (ML) INJECTION EVERY 8 HOURS
Refills: 0 | Status: DISCONTINUED | OUTPATIENT
Start: 2025-05-10 | End: 2025-05-11

## 2025-05-10 RX ORDER — NETARSUDIL 0.2 MG/ML
1 SOLUTION/ DROPS OPHTHALMIC; TOPICAL
Refills: 0 | DISCHARGE

## 2025-05-10 RX ORDER — ASPIRIN 325 MG
81 TABLET ORAL DAILY
Refills: 0 | Status: DISCONTINUED | OUTPATIENT
Start: 2025-05-10 | End: 2025-05-13

## 2025-05-10 RX ORDER — ATORVASTATIN CALCIUM 80 MG/1
40 TABLET, FILM COATED ORAL AT BEDTIME
Refills: 0 | Status: DISCONTINUED | OUTPATIENT
Start: 2025-05-10 | End: 2025-05-13

## 2025-05-10 RX ORDER — CYANOCOBALAMIN 1000 UG/ML
1 INJECTION INTRAMUSCULAR; SUBCUTANEOUS
Refills: 0 | DISCHARGE

## 2025-05-10 RX ORDER — ACETAMINOPHEN 500 MG/5ML
650 LIQUID (ML) ORAL EVERY 6 HOURS
Refills: 0 | Status: DISCONTINUED | OUTPATIENT
Start: 2025-05-10 | End: 2025-05-13

## 2025-05-10 RX ORDER — ENOXAPARIN SODIUM 100 MG/ML
40 INJECTION SUBCUTANEOUS EVERY 24 HOURS
Refills: 0 | Status: DISCONTINUED | OUTPATIENT
Start: 2025-05-10 | End: 2025-05-13

## 2025-05-10 RX ORDER — CYANOCOBALAMIN 1000 UG/ML
1000 INJECTION INTRAMUSCULAR; SUBCUTANEOUS DAILY
Refills: 0 | Status: DISCONTINUED | OUTPATIENT
Start: 2025-05-10 | End: 2025-05-13

## 2025-05-10 RX ADMIN — DORZOLAMIDE 1 DROP(S): 20 SOLUTION/ DROPS OPHTHALMIC at 21:17

## 2025-05-10 RX ADMIN — ATORVASTATIN CALCIUM 40 MILLIGRAM(S): 80 TABLET, FILM COATED ORAL at 21:21

## 2025-05-10 RX ADMIN — LEVETIRACETAM 1500 MILLIGRAM(S): 10 INJECTION, SOLUTION INTRAVENOUS at 21:17

## 2025-05-10 RX ADMIN — LATANOPROST PF 1 DROP(S): 0.05 SOLUTION/ DROPS OPHTHALMIC at 21:28

## 2025-05-10 NOTE — H&P ADULT - ATTENDING COMMENTS
# ACUTE CVA VS. TIA  # HX OF CVA  # ? HX OF CERVICAL LAMINECTOMY  # DIZZINESS  - MONITOR ON TELEMETRY  - NOTED CT HEAD/NECK  - ON - ASA, PLAVIX, STATIN  - F/U TSH/LIPIDS/HBA1C  - F/U ECHOCARDIOGRAM W/ BUBBLE STUDY  - F/U MRI BRAIN  - F/U ST EVAL / BEDSIDE SWALLOW  - F/U PT EVAL  - NEUROLOGY CONSULT    # HX OF SEIZURE DISORDER    # HLD    # GI AND DVT PPX # CASE D/W SON AT BEDSIDE. ALL QUESTIONS ANSWERED    # ACUTE CVA VS. TIA  # HX OF CVA  # ? HX OF LUMBAR LAMINECTOMY  # DIZZINESS  - MONITOR ON TELEMETRY  - NOTED CT HEAD/NECK  - ON - ASA, PLAVIX, STATIN  - F/U TSH/LIPIDS/HBA1C  - F/U ECHOCARDIOGRAM W/ BUBBLE STUDY  - F/U MRI BRAIN  - F/U ST EVAL / BEDSIDE SWALLOW  - F/U PT EVAL  - NEUROLOGY CONSULT    # HX OF SEIZURE DISORDER    # HLD    # HX OF MULTINODULAR GOITER    # GI AND DVT PPX # CASE D/W SON AT BEDSIDE. ALL QUESTIONS ANSWERED    # ACUTE CVA VS. TIA  # HX OF CVA  # ? HX OF LUMBAR LAMINECTOMY  # DIZZINESS  - MONITOR ON TELEMETRY  - NOTED CT HEAD/NECK  - ON - ASA, PLAVIX, STATIN  - F/U TSH/LIPIDS/HBA1C  - F/U ECHOCARDIOGRAM W/ BUBBLE STUDY  - F/U MRI BRAIN  - F/U ST EVAL / BEDSIDE SWALLOW  - F/U PT EVAL  - NEUROLOGY CONSULT    # HX OF SEIZURE DISORDER    # HLD    # HX OF MULTINODULAR GOITER    # GLAUCOMA    # GI AND DVT PPX

## 2025-05-10 NOTE — ED PROVIDER NOTE - PROGRESS NOTE DETAILS
MD Tavo Hughes: Received signout from overnight team pending CT reads, EKG, and admission for ataxia.

## 2025-05-10 NOTE — ED PROVIDER NOTE - OBJECTIVE STATEMENT
69-year-old female with a past medical history of seizure disorder, history of stroke (was asymptomatic and stroke was found on MRI), hyperlipidemia presents with a 10-day history of feeling off balance while ambulating.  Patient states feels like she is rocking as if she is on a boat.  States she has to keep her legs wide apart to be able to steadily walk.  States she was on vacation when the symptoms started.  States she was using walking sticks to help herself get around.  States never had symptoms like this before.  Denies other complaints at this time.

## 2025-05-10 NOTE — H&P ADULT - ASSESSMENT
69F pmhx of asymptomatic CVA, seizure, HLD, multinodular goitre. P/w presistent dizziness c/b broad based gait. NIHSS 2. CT A head and Neck: no acute signs of ischemia, hemorrhage. Patient is being admitted to tele for persistent dizziness and ataxic gait to rule out posterior circulation CVA.

## 2025-05-10 NOTE — ED PROVIDER NOTE - CLINICAL SUMMARY MEDICAL DECISION MAKING FREE TEXT BOX
69-year-old female presents with ataxic gait.  PE as above.    EKG, labs, CTA head and neck, reassess.

## 2025-05-10 NOTE — H&P ADULT - NSHPSOCIALHISTORY_GEN_ALL_CORE
lives at home with   denies substance use  ambulated independently at base line  occupation/ recreation: dances, yoga.

## 2025-05-10 NOTE — H&P ADULT - NSHPPHYSICALEXAM_GEN_ALL_CORE
PHYSICAL EXAM:  GENERAL: NAD, speaks in full sentences, no signs of respiratory distress  HEAD:  Atraumatic, Normocephalic  EYES: EOMI, conjunctiva and sclera clear  NECK: Supple, No JVD  CHEST/LUNG: Clear to auscultation bilaterally; No wheeze; No crackles; No accessory muscles used  HEART: Regular rate and rhythm; No murmurs;   ABDOMEN: Soft, Nontender, Nondistended; Bowel sounds present; No guarding  EXTREMITIES:  2+ Peripheral Pulses, No cyanosis or edema  PSYCH: AAOx3  NEUROLOGY:   - Cranial Nerves II-XII:    II:  visual fields are full to confrontation  III, IV, VI:  EOMI, no nystagmus appreciated  V:  facial sensation is intact in the V1-V3 distribution bilaterally.  VII:  face is symmetric with normal eye closure and smile  VIII:  hearing is intact to finger rub  IX, X:  uvula is midline and soft palate rises symmetrically  XI:  head turning and shoulder shrug are intact bilaterally  XII:  tongue protrudes in the midline  - Motor:  strength is 5/5 throughout; normal muscle bulk and tone throughout; no pronator drift  - Reflexes:  2+ and symmetric  - Sensory:  intact to light touch, pin prick, vibration, and joint-position sense throughout  - Coordination:  finger-nose-finger and heel-knee-shin intact without dysmetria; rapid alternating hand movements intact  - Gait:  upon standing mild swaying bilaterally, unable to walk unassisted for more than 2 steps, broad based gait. arm shuffling unable to exam given patient required assistance to walk.   SKIN: No rashes or lesions PHYSICAL EXAM:  GENERAL: NAD, speaks in full sentences, no signs of respiratory distress  HEAD:  Atraumatic, Normocephalic  EYES: EOMI, conjunctiva and sclera clear  NECK: Supple, No JVD  CHEST/LUNG: Clear to auscultation bilaterally; No wheeze; No crackles; No accessory muscles used  HEART: Regular rate and rhythm; No murmurs;   ABDOMEN: Soft, Nontender, Nondistended; Bowel sounds present; No guarding  EXTREMITIES:  2+ Peripheral Pulses, No cyanosis or edema  PSYCH: AAOx3  NEUROLOGY:   - Cranial Nerves II-XII:    II:  visual fields are full to confrontation  III, IV, VI:  EOMI, no nystagmus appreciated  V:  facial sensation is intact in the V1-V3 distribution bilaterally.  VII:  face is symmetric with normal eye closure and smile  VIII:  hearing is intact to finger rub  IX, X:  uvula is midline and soft palate rises symmetrically  XI:  head turning and shoulder shrug are intact bilaterally  XII:  tongue protrudes in the midline  - Motor:  strength is 5/5 throughout; normal muscle bulk and tone throughout; no pronator drift  - Reflexes:  2+ and symmetric  - Sensory:  intact to touch  - Coordination:  finger-nose-finger and heel-knee-shin intact without dysmetria; rapid alternating hand movements intact  - Gait:  upon standing mild swaying bilaterally, unable to walk unassisted for more than 2 steps, broad based gait. arm shuffling unable to exam given patient required assistance to walk.   SKIN: No rashes or lesions

## 2025-05-10 NOTE — H&P ADULT - PROBLEM SELECTOR PLAN 1
p/w persistent dizziness with wide based gait for 10 days with "boat rocking" sensation. denies room spinning sensation.  prior hx of asymptomatic stroke was on prior use of ASA 81  w/ last known normal 10 days ago  code stroke in ED with NIHSS 2 (bilateral LE ataxia)  ASA qd, high intensity statin qhs, plavix x21 days if true stroke  CT A head and neck, stroke protocol showed no signs of acute ischemia, hemorrhage or solid mass. patent vasculature.   Tele monitoring  F/U Echo with bubble study  Pt not rTPA candidate due to: out of window  Monitor BP - allow permissive htn x24hr from last known normal  PT consult  f/u lipid panel and A1c  Neuro checks q4  Neuro Consulted: p/w persistent dizziness with wide based gait for 10 days with "boat rocking" sensation. denies room spinning sensation.  prior hx of asymptomatic stroke was on prior use of ASA 81  w/ last known normal 10 days ago  code stroke in ED with NIHSS 2 (bilateral LE ataxia)  ASA qd, high intensity statin qhs, plavix x21 days if true stroke  CT A head and neck, stroke protocol showed no signs of acute ischemia, hemorrhage or solid mass. patent vasculature.   Tele monitoring  F/U Echo with bubble study  Pt not rTPA candidate due to: out of window  Monitor BP - allow permissive htn x24hr from last known normal  PT consult  f/u lipid panel and A1c  Neuro checks q4  Neuro Consulted: Dr. Ochoa

## 2025-05-10 NOTE — H&P ADULT - NSHPREVIEWOFSYSTEMS_GEN_ALL_CORE
T(C): 36.6 (05-10-25 @ 15:47), Max: 36.9 (05-10-25 @ 06:29)  HR: 55 (05-10-25 @ 15:47) (55 - 78)  BP: 119/62 (05-10-25 @ 15:47) (108/63 - 124/75)  RR: 18 (05-10-25 @ 15:47) (16 - 18)  SpO2: 99% (05-10-25 @ 15:47) (97% - 99%)    REVIEW OF SYSTEMS:  CONSTITUTIONAL: No weakness, fevers or chills  EYES/ENT: No visual changes;  No vertigo or throat pain   NECK: No pain or stiffness  RESPIRATORY: No cough, wheezing, hemoptysis; No shortness of breath  CARDIOVASCULAR: No chest pain or palpitations  GASTROINTESTINAL: No abdominal or epigastric pain. No nausea, vomiting, or hematemesis; No diarrhea or constipation. No melena or hematochezia.  GENITOURINARY: No dysuria, frequency or hematuria  NEUROLOGICAL: No numbness or weakness. abnormal gait and "boat rocking" sensation  SKIN: No itching, rashes

## 2025-05-10 NOTE — H&P ADULT - HISTORY OF PRESENT ILLNESS
69F pmhx of asymptomatic CVA, seizure, HLD, multinodular goitre. P/w presistent "boat rocking" sensation for the past 10days associated with abnormal gait for same duration. She traveled to [place] with family with the symptoms. She is able to ambulate cautiously with hiking poles, she is more "stable" on level ground however during the trip when they were walking on grass and in a [place] she was more unstable. He denies room spinning sensation, tinnitus, weakness in extremities, spasticity tremors, high stepping gait, slapping foot on the floor, veering towards one side, vision or auditory changes, headaches, fevers, URI or GI symptoms, sick contacts. She denies changes in her medications. She noticed walking with a broad based gait provided more stability. Denies similar symptoms in the past, vertigo, paralysis, decreased sensation.

## 2025-05-10 NOTE — H&P ADULT - PROBLEM SELECTOR PLAN 2
as above  DD: central vertigo 2/2 posterior circulation CVA vs hypothyroidism vs B12 deficiency from omeprazole use as above  DD: central vertigo 2/2 posterior circulation CVA vs hypothyroidism vs B12 deficiency as above  DD: central vertigo 2/2 posterior circulation CVA vs hypothyroidism vs B12 deficiency  f/u tsh and B12 level  c/w home med B12 suppliment

## 2025-05-10 NOTE — ED ADULT NURSE NOTE - NS ED NOTE ABUSE RESPONSE YN
Discussed supportive tx such as shoes, icing, stretches, shoes/slippers at home, Stretches, untucking bed sheets   Yes

## 2025-05-10 NOTE — ED ADULT TRIAGE NOTE - INTERNATIONAL TRAVEL
[facility]   Physical Therapy Plan of Care Update     Name: Sheri Ho  Clinic Number: [id number]     Therapy Diagnosis: neck pain, right shoulder pain  Physician: Cain Treviño MD     Visit Date: 8/15/2022     Physician: Slime Zambrano FNP     Physician Orders: PT Eval and Treat   Medical Diagnosis from Referral: see above  Evaluation Date: 7/11/2022  Authorization Period Expiration: 6/21/23  Plan of Care Expiration: 10/9/2022  Progress Note Due: 9/11/22  Visit # / Visits authorized: 6/17   FOTO: 34/49     PTA Visit #: 0/5      Time In: 0920  Time Out: 1000  Total Billable Time: 40 minutes     SUBJECTIVE      Pt reports:  I took a muscle relaxer last night, I'm still feeling drowsy from it.  She was compliant with home exercise program.  Response to previous treatment: myofascial treatment helped right shoulder  Functional change: none      Pain: 5/10  Location: bilateral neck, upper trap     OBJECTIVE      Objective Measures updated at progress report unless specified.   Right shoulder flexion 110, er 65 ir L4  Treatment      Sheri received the treatments listed below:       therapeutic exercises to develop strength, ROM and posture for 10 minutes including:  UBE 0 minutes  Pulleys 0 minutes  Corner stretch 4x30sh  Bilateral shoulder external rotation sidelying  Shoulder horizontal abduction   Cybex rows close                        Wide   Supine cane flexion stretch [id number]  Supine cane flexion   Standing right shld flexion  Standing scaption   Scap retract / green  Chin tuck/scap retract x 10     manual therapy techniques: Joint mobilizations and Myofacial release were applied to the: neck for 35 minutes, including:  Upper trap stretch  4 x 30 sh  Scalene stretch   Graston: #5, #4 to scalenes, upper traps, suboccipitals; #3 j-stroke to cervical and thoracic paraspinals, left ant/mid deltoid      supervised modalities after being cleared for contradictions: TENS:  Sheri  received TENS electrical stimulation for pain to the neck. Pt received continuous mode for N/A minutes. Sheri tolerated treatment well without any adverse effects.      Patient Education and Home Exercises      Home Exercises Provided and Patient Education Provided      Education provided:   - encouraged to continue HEP     Written Home Exercises Provided: Patient instructed to cont prior HEP. Exercises were reviewed and Sheri was able to demonstrate them prior to the end of the session.  Sheri demonstrated good  understanding of the education provided. 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  Patient is aware of mass, she said she would get in contact with Dr Connor Carballo to schedule an appt  for evaluation.  Patient received moderate relief of left-sided neck pain /p graston treatment.     Sheri Is progressing towards her goals.   Pt prognosis is Good.      Pt will continue to benefit from skilled outpatient physical therapy to address the deficits listed in the problem list box on initial evaluation, provide pt/family education and to maximize pt's level of independence in the home and community environment.      Pt's spiritual, cultural and educational needs considered and pt agreeable to plan of care and goals.     Anticipated barriers to physical therapy: none     Goals:   Short Term Goals: 4 weeks   1.  Improve right shoulder ACTIVE ROM flexion to >130, er >65, ir to L4.  Right shoulder flexion 110, er 65, ir to L4, improved from 100/62/L5  2.  Improve right shld strength to 4.  Right shoulder flexion 4/5, improved from 4-/5; er left 4/5, right 4/5, ir to L4 improved from L5.  3.  Reach in overhead shelf for 2# weight /s deviation. Patient is able to place 1# weight in 1st overhead shelf.  4.  Tolerate 45 minutes of exercise with <7/10 pain in neck and right shld. Goal Met  Long Term  Goals: 8 weeks Not Met  1.  Improve right shld ACTIVE ROM >140, ir to L2  2.  Improve right shld strength to 4+  3.  Reach in overhead shelf for 4# weight /s deviation  4.  Tolerate 5 minutes of position tolerance overhead with <5/10.        PLAN      Plan of care Certification: 7/11/2022 to 10/9/2022.     Outpatient Physical Therapy 2 times weekly for 8 weeks to include the following interventions: Cervical/Lumbar Traction, Electrical Stimulation 0-300hz, Iontophoresis (with 2.0 ml dexamethasone), Manual Therapy, Moist Heat/ Ice, Neuromuscular Re-ed, Patient Education, Self Care, Therapeutic Activities, Therapeutic Exercise and Ultrasound.         ROZINA STANFORD, PT            Yes

## 2025-05-10 NOTE — ED ADULT NURSE NOTE - OBJECTIVE STATEMENT
pt is here for dizziness.  pt stated that dizziness x 10 days, denied chest pain or shortness of breath,

## 2025-05-10 NOTE — ED ADULT TRIAGE NOTE - GLASGOW COMA SCALE: BEST MOTOR RESPONSE, MLM
M6) obeys commands

## 2025-05-10 NOTE — ED ADULT NURSE NOTE - SCORE
Since you're not giving her something for pain before her injection, is it okay for her to take some ibuprofen?   1

## 2025-05-10 NOTE — ED ADULT TRIAGE NOTE - CHIEF COMPLAINT QUOTE
" I just came back from [place] yesterday & the whole time I was there for 10 days I was feeling dizzy & i' m still dizzy feels like i' m on a rea boat can't get my balance".  PMH- Seizures, HLD, CVA. Denies any pain, SOB

## 2025-05-11 PROBLEM — I73.00 RAYNAUD'S SYNDROME WITHOUT GANGRENE: Chronic | Status: INACTIVE | Noted: 2021-06-05 | Resolved: 2025-05-10

## 2025-05-11 LAB
A1C WITH ESTIMATED AVERAGE GLUCOSE RESULT: 5.3 % — SIGNIFICANT CHANGE UP (ref 4–5.6)
ANION GAP SERPL CALC-SCNC: 7 MMOL/L — SIGNIFICANT CHANGE UP (ref 5–17)
BUN SERPL-MCNC: 19 MG/DL — HIGH (ref 7–18)
CALCIUM SERPL-MCNC: 9.1 MG/DL — SIGNIFICANT CHANGE UP (ref 8.4–10.5)
CHLORIDE SERPL-SCNC: 110 MMOL/L — HIGH (ref 96–108)
CHOLEST SERPL-MCNC: 179 MG/DL — SIGNIFICANT CHANGE UP
CO2 SERPL-SCNC: 23 MMOL/L — SIGNIFICANT CHANGE UP (ref 22–31)
CREAT SERPL-MCNC: 0.75 MG/DL — SIGNIFICANT CHANGE UP (ref 0.5–1.3)
EGFR: 87 ML/MIN/1.73M2 — SIGNIFICANT CHANGE UP
EGFR: 87 ML/MIN/1.73M2 — SIGNIFICANT CHANGE UP
ESTIMATED AVERAGE GLUCOSE: 105 MG/DL — SIGNIFICANT CHANGE UP (ref 68–114)
GLUCOSE SERPL-MCNC: 106 MG/DL — HIGH (ref 70–99)
HCT VFR BLD CALC: 40 % — SIGNIFICANT CHANGE UP (ref 34.5–45)
HDLC SERPL-MCNC: 109 MG/DL — SIGNIFICANT CHANGE UP
HGB BLD-MCNC: 14.5 G/DL — SIGNIFICANT CHANGE UP (ref 11.5–15.5)
LDLC SERPL-MCNC: 61 MG/DL — SIGNIFICANT CHANGE UP
LIPID PNL WITH DIRECT LDL SERPL: 61 MG/DL — SIGNIFICANT CHANGE UP
MAGNESIUM SERPL-MCNC: 2.2 MG/DL — SIGNIFICANT CHANGE UP (ref 1.6–2.6)
MCHC RBC-ENTMCNC: 28.5 PG — SIGNIFICANT CHANGE UP (ref 27–34)
MCHC RBC-ENTMCNC: 36.3 G/DL — HIGH (ref 32–36)
MCV RBC AUTO: 78.6 FL — LOW (ref 80–100)
NONHDLC SERPL-MCNC: 70 MG/DL — SIGNIFICANT CHANGE UP
NRBC BLD AUTO-RTO: 0 /100 WBCS — SIGNIFICANT CHANGE UP (ref 0–0)
PHOSPHATE SERPL-MCNC: 3.8 MG/DL — SIGNIFICANT CHANGE UP (ref 2.5–4.5)
PLATELET # BLD AUTO: 280 K/UL — SIGNIFICANT CHANGE UP (ref 150–400)
POTASSIUM SERPL-MCNC: 3.9 MMOL/L — SIGNIFICANT CHANGE UP (ref 3.5–5.3)
POTASSIUM SERPL-SCNC: 3.9 MMOL/L — SIGNIFICANT CHANGE UP (ref 3.5–5.3)
RBC # BLD: 5.09 M/UL — SIGNIFICANT CHANGE UP (ref 3.8–5.2)
RBC # FLD: 13.7 % — SIGNIFICANT CHANGE UP (ref 10.3–14.5)
SODIUM SERPL-SCNC: 140 MMOL/L — SIGNIFICANT CHANGE UP (ref 135–145)
TRIGL SERPL-MCNC: 41 MG/DL — SIGNIFICANT CHANGE UP
TSH SERPL-MCNC: 0.76 UU/ML — SIGNIFICANT CHANGE UP (ref 0.34–4.82)
VIT B12 SERPL-MCNC: 951 PG/ML — SIGNIFICANT CHANGE UP (ref 232–1245)
WBC # BLD: 7.45 K/UL — SIGNIFICANT CHANGE UP (ref 3.8–10.5)
WBC # FLD AUTO: 7.45 K/UL — SIGNIFICANT CHANGE UP (ref 3.8–10.5)

## 2025-05-11 PROCEDURE — 99223 1ST HOSP IP/OBS HIGH 75: CPT

## 2025-05-11 RX ADMIN — DORZOLAMIDE 1 DROP(S): 20 SOLUTION/ DROPS OPHTHALMIC at 17:45

## 2025-05-11 RX ADMIN — LATANOPROST PF 1 DROP(S): 0.05 SOLUTION/ DROPS OPHTHALMIC at 22:10

## 2025-05-11 RX ADMIN — DORZOLAMIDE 1 DROP(S): 20 SOLUTION/ DROPS OPHTHALMIC at 05:23

## 2025-05-11 RX ADMIN — ENOXAPARIN SODIUM 40 MILLIGRAM(S): 100 INJECTION SUBCUTANEOUS at 12:29

## 2025-05-11 RX ADMIN — LEVETIRACETAM 1500 MILLIGRAM(S): 10 INJECTION, SOLUTION INTRAVENOUS at 12:28

## 2025-05-11 RX ADMIN — CYANOCOBALAMIN 1000 MICROGRAM(S): 1000 INJECTION INTRAMUSCULAR; SUBCUTANEOUS at 12:28

## 2025-05-11 RX ADMIN — CLOPIDOGREL BISULFATE 75 MILLIGRAM(S): 75 TABLET, FILM COATED ORAL at 12:28

## 2025-05-11 RX ADMIN — Medication 81 MILLIGRAM(S): at 12:28

## 2025-05-11 NOTE — PROGRESS NOTE ADULT - ATTENDING COMMENTS
# CASE D/W SON AT BEDSIDE. ALL QUESTIONS ANSWERED    # ACUTE CVA VS. TIA  # HX OF CVA  # ? HX OF LUMBAR LAMINECTOMY  # DIZZINESS  - MONITOR ON TELEMETRY  - NOTED CT HEAD/NECK  - ON - ASA, PLAVIX, STATIN  - F/U TSH/LIPIDS/HBA1C  - F/U ECHOCARDIOGRAM W/ BUBBLE STUDY  - F/U MRI BRAIN  - F/U ST EVAL / BEDSIDE SWALLOW  - F/U PT EVAL  - NEUROLOGY CONSULT    # HX OF SEIZURE DISORDER    # HLD    # HX OF MULTINODULAR GOITER    # GLAUCOMA    # GI AND DVT PPX. # ACUTE CVA VS. TIA  # ? VERTIGO  # HX OF CVA  # ? HX OF LUMBAR LAMINECTOMY  # DIZZINESS  - MONITOR ON TELEMETRY  - NOTED CT HEAD/NECK  - ON - ASA, PLAVIX, STATIN  - NOTED TSH/LIPIDS/HBA1C  - F/U ECHOCARDIOGRAM W/ BUBBLE STUDY  - F/U MRI BRAIN  - F/U ST EVAL / BEDSIDE SWALLOW  - F/U PT EVAL  - NEUROLOGY CONSULT    # HX OF SEIZURE DISORDER    # HLD    # HX OF MULTINODULAR GOITER    # GLAUCOMA    # GI AND DVT PPX.

## 2025-05-11 NOTE — PROGRESS NOTE ADULT - PROBLEM SELECTOR PLAN 2
as above  DD: central vertigo 2/2 posterior circulation CVA vs hypothyroidism vs B12 deficiency  f/u tsh and B12 level  c/w home med B12 suppliment as above  DD: central vertigo 2/2 posterior circulation CVA vs hypothyroidism vs B12 deficiency  tsh wnl  -f/u B12 level  -c/w home med B12 suppliment

## 2025-05-11 NOTE — CONSULT NOTE ADULT - ASSESSMENT
Mrs. 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 Taylor. Also with hx of HLD, multinodular goiter, Glaucoma, and questionable bilateral tiny BG lancunes (past MRI looks normal to my read). She is presenting with subacute vestibular syndrome, namely lateral translational vertigo rather than rotational. Exam is normal. Given acute onset and persistence over 10 days, would rule out stroke. Patient mostly without risk factors. I dont believe she has had old strokes. If not stroke, would consider vestibular pathology, particularly with the sacule vs utricle. Could consider labrynthitis though patient without any viral prodrome.       - MRI brain w/ and w/o  - MRI IAC w/ and w/o  - PT eval and treat  - likely could use referral for vestibular rehab on discharge  - continue home Keppra 1500 mg BID

## 2025-05-11 NOTE — CONSULT NOTE ADULT - SUBJECTIVE AND OBJECTIVE BOX
Neurology Consultation     HPI:  Mrs. 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 Taylor. Also with hx of HLD, multinodular goiter, Glaucoma, and questionable bilateral tiny BG lancunes (past MRI looks normal to my read). Patient is presenting with 10 days of unsteadiness with walking that has been persistent. Symptoms started 4/30/2025. She tried to get OOB in the morning and notice with standing that she had swaying sensation, as if on a boat, mostly side to side swaying. When in bed laying supine, can sometimes feel this sensation when turning in bed. Since that time symptoms have not really changed. Denies false movement in the sense of room spinning. Denies tinnitus, hearing loss/changes, ear pain, globus sensation. No recent infectious symptoms. Denies current headache symptoms. Very rare migraine history. No toxic habits, no recreational substances.    Patient has had 3 lifetime seizure events, all from sleep. First was in 6/2021, workup including MRI was normal to my read. Second event was 4/2022, then started on Keppra. Was reporting some possible aura symptoms like odd smell. No evidence of MTS on MRIs. Most recent szr and last episode was 11/2023 and keppra was increased to current dose of 1500 mg BID. Patient is adherent to this.    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[employer], mostly working from home 15 hrs weekly       PAST MEDICAL & SURGICAL HISTORY:  Seizure  CVA (cerebrovascular accident)  HLD (hyperlipidemia)  History of D&C  H/O colonoscopy      FAMILY HISTORY:    SOCIAL HISTORY: no smoking, alcohol, drug use hx    MEDICATIONS (HOME):  Home Medications:  atorvastatin 20 mg oral tablet: 1 tab(s) orally once a day (at bedtime) (10 May 2025 14:11)  cyanocobalamin 100 mcg oral tablet: 1 tab(s) orally once a day (10 May 2025 14:11)  dorzolamide 2% ophthalmic solution: 1 drop(s) in each eye 2 times a day (10 May 2025 14:11)  Keppra 750 mg oral tablet: 2 tab(s) orally 2 times a day (10 May 2025 14:11)  latanoprost 0.005% ophthalmic solution: 1 drop(s) in each eye once a day (10 May 2025 14:11)  Rhopressa 0.02% ophthalmic solution: 1 drop(s) in each eye once a day (10 May 2025 14:11)  zinc (as acetate) 50 mg oral capsule: 1 cap(s) orally 2 times a day (10 May 2025 14:11)    MEDICATIONS  (STANDING):  aspirin  chewable 81 milliGRAM(s) Oral daily  atorvastatin 40 milliGRAM(s) Oral at bedtime  clopidogrel Tablet 75 milliGRAM(s) Oral daily  cyanocobalamin 1000 MICROGram(s) Oral daily  dorzolamide 2% Ophthalmic Solution 1 Drop(s) Both EYES <User Schedule>  enoxaparin Injectable 40 milliGRAM(s) SubCutaneous every 24 hours  latanoprost 0.005% Ophthalmic Solution 1 Drop(s) Both EYES at bedtime  levETIRAcetam 1500 milliGRAM(s) Oral <User Schedule>    MEDICATIONS  (PRN):  acetaminophen     Tablet .. 650 milliGRAM(s) Oral every 6 hours PRN Temp greater or equal to 38C (100.4F), Mild Pain (1 - 3)  aluminum hydroxide/magnesium hydroxide/simethicone Suspension 30 milliLiter(s) Oral every 4 hours PRN Dyspepsia  melatonin 3 milliGRAM(s) Oral at bedtime PRN Insomnia  ondansetron Injectable 4 milliGRAM(s) IV Push every 8 hours PRN Nausea and/or Vomiting    ALLERGIES/INTOLERANCES:  Allergies  Bactrim (Hives)    Intolerances    VITALS & EXAMINATION:  Vital Signs Last 24 Hrs  T(C): 36.5 (11 May 2025 07:47), Max: 36.7 (10 May 2025 14:25)  T(F): 97.7 (11 May 2025 07:47), Max: 98.1 (10 May 2025 14:25)  HR: 55 (11 May 2025 07:47) (52 - 78)  BP: 127/58 (11 May 2025 07:47) (108/63 - 145/78)  BP(mean): 79 (11 May 2025 07:47) (79 - 89)  RR: 18 (11 May 2025 07:47) (16 - 18)  SpO2: 100% (11 May 2025 07:47) (97% - 100%)    Parameters below as of 11 May 2025 07:47  Patient On (Oxygen Delivery Method): room air    General:  Constitutional: Female, appears stated age, nontoxic, not in distress,    Head: Normocephalic;   Eyes: clear sclera;   Extremities: No cyanosis;   Resp: breathing comfortably  Neck: no nuchal rigidity     Neurological (>12):  MS: Awake, alert.  Oriented person place situation year month. Follows simple complex cross commands. Able to ID 3/3 objects. Attends to examiner  Language: Speech is hypophonic, clear, fluent, good repetition,  comprehension, registration of words.  CNs: PERRL (R 3mm, L 3mm). VFF. EOMI. No disconjugate gaze, no nystagmus. V1-3 intact LT, No facial asymmetry b/l. Hearing grossly normal b/l. Tongue midline and can move side to side.     Normal Head impulse test  no skew w/ cross cover  no nystagmus in primary or secondary gaze positions    Motor - Normal bulk and tone throughout. No pronator drift.    L/R (out of 5 each)       Deltoid  5/5    Biceps   5/5      Triceps  5/5         Wrist Extension 5/5   Wrist Flexion  5/5   Interossei 5/5     5/5   L/R (out of 5 each)       Hip Flexion  5/5    Hip Extension  5/5  Knee Extension  5/5  Dorsiflexion  5/5      Plantar Flexion 5/5     Sensation: Intact to LT b/l x4 extremities. Cortical: Extinction on DSS (neglect): none  Reflexes L/R:  Biceps(C5) 2/2  BR(C6) 2/2   Triceps(C7)  2/2 Patellar(L4)   2/2   Ankles 2/2   Toes: mute b/l  no ankle clonus  Coordination: No dysmetria to FTN b/l UE  Gait: wide based tentative and sways mostly ant-posterior  +Romberg, does not actually need support, able to catch herself, or hold onto nearby surfaces    LABORATORY:  CBC                       14.5   7.45  )-----------( 280      ( 11 May 2025 06:01 )             40.0     Chem 05-11    140  |  110[H]  |  19[H]  ----------------------------<  106[H]  3.9   |  23  |  0.75    Ca    9.1      11 May 2025 06:01  Phos  3.8     05-11  Mg     2.2     05-11    TPro  7.3  /  Alb  3.6  /  TBili  0.6  /  DBili  x   /  AST  22  /  ALT  35  /  AlkPhos  72  05-10    LFTs LIVER FUNCTIONS - ( 10 May 2025 07:30 )  Alb: 3.6 g/dL / Pro: 7.3 g/dL / ALK PHOS: 72 U/L / ALT: 35 U/L DA / AST: 22 U/L / GGT: x           Coagulopathy PT/INR - ( 10 May 2025 07:30 )   PT: 12.3 sec;   INR: 1.05 ratio         PTT - ( 10 May 2025 07:30 )  PTT:38.1 sec  Lipid Panel 05-11 Chol 179 LDL --  Trig 41      U/A Urinalysis Basic - ( 11 May 2025 06:01 )    Color: x / Appearance: x / SG: x / pH: x  Gluc: 106 mg/dL / Ketone: x  / Bili: x / Urobili: x   Blood: x / Protein: x / Nitrite: x   Leuk Esterase: x / RBC: x / WBC x   Sq Epi: x / Non Sq Epi: x / Bacteria: x        STUDIES & IMAGING: (EEG, CT, MR, U/S, TTE/ESTHER):  CT head normal

## 2025-05-11 NOTE — PROGRESS NOTE ADULT - PROBLEM SELECTOR PLAN 4
home med: atorvastatin 20  started Atorvastatin 40 HS   f/u lipid panel home med: atorvastatin 20  LDL 61  -c/w Atorvastatin 40 HS

## 2025-05-11 NOTE — PROGRESS NOTE ADULT - SUBJECTIVE AND OBJECTIVE BOX
SUBJECTIVE: **TO UPDATE**  No overnight events. Pt seen at bedside, states that they feel "__________." Pt endorses _____. Pt denies headache, fever, chills, SOB, chest pain, cough, abd pain, n/v/d, constipation, dysuria, urinary frequency, back pain, myalgias, weakness, dizziness, gait disturbance, numbness/tingling, blurry vision.      OBJECTIVE:    T(C): 36.5 (05-11-25 @ 07:47), Max: 36.7 (05-10-25 @ 14:25)  HR: 55 (05-11-25 @ 07:47) (52 - 78)  BP: 127/58 (05-11-25 @ 07:47) (108/63 - 145/78)  RR: 18 (05-11-25 @ 07:47) (16 - 18)  SpO2: 100% (05-11-25 @ 07:47) (97% - 100%)            ***TO BE EDITED***  CONSTITUTIONAL: No apparent distress, resting comfortably  EYES: Pupils symmetric, EOMI, No conjunctival or scleral injection, non-icteric  ENMT: Oral mucosa with moist membranes  RESP: No respiratory distress, no use of accessory muscles; CTA b/l, no crackles or wheezes  CV: RRR, +S1S2, no murmurs appreciated; no peripheral edema  GI: Soft, NTND, no rebound, no guarding  MSK: No digital clubbing or cyanosis; Extremities moving equally without additional effort; gait not appreciated  : deferred  SKIN: No rashes or ulcers noted  NEURO: CN II-XII grossly intact   PSYCH: Appropriate insight/judgment; A+O x 3, mood and affect appropriate, recent/remote memory intact    Bactrim (Hives)      acetaminophen     Tablet .. 650 milliGRAM(s) Oral every 6 hours PRN  aluminum hydroxide/magnesium hydroxide/simethicone Suspension 30 milliLiter(s) Oral every 4 hours PRN  aspirin  chewable 81 milliGRAM(s) Oral daily  atorvastatin 40 milliGRAM(s) Oral at bedtime  clopidogrel Tablet 75 milliGRAM(s) Oral daily  cyanocobalamin 1000 MICROGram(s) Oral daily  dorzolamide 2% Ophthalmic Solution 1 Drop(s) Both EYES <User Schedule>  enoxaparin Injectable 40 milliGRAM(s) SubCutaneous every 24 hours  latanoprost 0.005% Ophthalmic Solution 1 Drop(s) Both EYES at bedtime  levETIRAcetam 1500 milliGRAM(s) Oral <User Schedule>  melatonin 3 milliGRAM(s) Oral at bedtime PRN  ondansetron Injectable 4 milliGRAM(s) IV Push every 8 hours PRN                            14.5   7.45  )-----------( 280      ( 11 May 2025 06:01 )             40.0     05-11    140  |  110[H]  |  19[H]  ----------------------------<  106[H]  3.9   |  23  |  0.75    Ca    9.1      11 May 2025 06:01  Phos  3.8     05-11  Mg     2.2     05-11    TPro  7.3  /  Alb  3.6  /  TBili  0.6  /  DBili  x   /  AST  22  /  ALT  35  /  AlkPhos  72  05-10         SUBJECTIVE: No overnight events. Pt seen at bedside, states that they feel "okay this morning." Pt endorses persistent dizziness with postural changes, unchanged ambulatory dysfunction for the past several days. Pt also noted that she has slight dysphagia for the past few weeks but does not cough or choke on food. Pt denies headache, SOB, chest pain,  abd pain, n/v/d, constipation, weakness, numbness/tingling, blurry vision, changes in vision.      OBJECTIVE:    T(C): 36.5 (05-11-25 @ 07:47), Max: 36.7 (05-10-25 @ 14:25)  HR: 55 (05-11-25 @ 07:47) (52 - 78)  BP: 127/58 (05-11-25 @ 07:47) (108/63 - 145/78)  RR: 18 (05-11-25 @ 07:47) (16 - 18)  SpO2: 100% (05-11-25 @ 07:47) (97% - 100%)            CONSTITUTIONAL: No apparent distress, resting comfortably  EYES: No nystagmus; Pupils symmetric, EOMI, No conjunctival or scleral injection, non-icteric  ENMT: Oral mucosa with moist membranes  RESP: No respiratory distress, no use of accessory muscles; CTA b/l, no crackles or wheezes  CV: RRR, +S1S2, no murmurs appreciated; no peripheral edema  GI: Soft, NTND, no rebound, no guarding  MSK: No digital clubbing or cyanosis; Extremities moving equally without additional effort; gait not appreciated  : deferred  SKIN: No rashes or ulcers noted  NEURO: CN II-XII grossly intact, strength/sensation intact and equal b/l.  PSYCH: Appropriate insight/judgment; A+O x 3, mood and affect appropriate, recent/remote memory intact        Bactrim (Hives)      acetaminophen     Tablet .. 650 milliGRAM(s) Oral every 6 hours PRN  aluminum hydroxide/magnesium hydroxide/simethicone Suspension 30 milliLiter(s) Oral every 4 hours PRN  aspirin  chewable 81 milliGRAM(s) Oral daily  atorvastatin 40 milliGRAM(s) Oral at bedtime  clopidogrel Tablet 75 milliGRAM(s) Oral daily  cyanocobalamin 1000 MICROGram(s) Oral daily  dorzolamide 2% Ophthalmic Solution 1 Drop(s) Both EYES <User Schedule>  enoxaparin Injectable 40 milliGRAM(s) SubCutaneous every 24 hours  latanoprost 0.005% Ophthalmic Solution 1 Drop(s) Both EYES at bedtime  levETIRAcetam 1500 milliGRAM(s) Oral <User Schedule>  melatonin 3 milliGRAM(s) Oral at bedtime PRN  ondansetron Injectable 4 milliGRAM(s) IV Push every 8 hours PRN                            14.5   7.45  )-----------( 280      ( 11 May 2025 06:01 )             40.0     05-11    140  |  110[H]  |  19[H]  ----------------------------<  106[H]  3.9   |  23  |  0.75    Ca    9.1      11 May 2025 06:01  Phos  3.8     05-11  Mg     2.2     05-11    TPro  7.3  /  Alb  3.6  /  TBili  0.6  /  DBili  x   /  AST  22  /  ALT  35  /  AlkPhos  72  05-10    < from: CT Angio Neck w/ IV Cont (05.10.25 @ 09:57) >  IMPRESSION:      1. Unremarkable CT study of the brain. Clear sinuses and mastoids.  2. Neck vasculature is widely patent, but there is a lateral displacement   of both common carotid arteries by a diffusely enlarged and heterogeneous   thyroid gland indicative of multinodular goiter.  3. Intracerebral vasculature is widely patent, with no focal stenosis,   dissection, or occlusion.  4. Venous sinuses are patent intracranially.    --- End of Report ---    < end of copied text >

## 2025-05-11 NOTE — PATIENT PROFILE ADULT - FALL HARM RISK - RISK INTERVENTIONS

## 2025-05-11 NOTE — PROGRESS NOTE ADULT - PROBLEM SELECTOR PLAN 1
p/w persistent dizziness with wide based gait for 10 days with "boat rocking" sensation. denies room spinning sensation.  prior hx of asymptomatic stroke was on prior use of ASA 81  w/ last known normal 10 days ago  code stroke in ED with NIHSS 2 (bilateral LE ataxia)  ASA qd, high intensity statin qhs, plavix x21 days if true stroke  CT A head and neck, stroke protocol showed no signs of acute ischemia, hemorrhage or solid mass. patent vasculature.   Tele monitoring  F/U Echo with bubble study  Pt not rTPA candidate due to: out of window  Monitor BP - allow permissive htn x24hr from last known normal  PT consult  f/u lipid panel and A1c  Neuro checks q4  Neuro Consulted: Dr. Ochoa p/w persistent dizziness with wide based gait for 10 days with "boat rocking" sensation. denies room spinning sensation.  prior hx of asymptomatic stroke was on prior use of ASA 81  w/ last known normal 10 days ago  code stroke in ED with NIHSS 2 (bilateral LE ataxia)  CT A head and neck, stroke protocol showed no signs of acute ischemia, hemorrhage or solid mass. patent vasculature.   Pt not rTPA candidate due to: out of window  -Tele monitoring  -ASA qd, high intensity statin qhs, plavix x21 days if true stroke  -F/U Echo with bubble study  -Monitor BP - allow permissive htn x24hr from last known normal  -PT consulted  -MRI head w/wout, IAC w/wout  -Neuro checks q4  -Neuro Consulted: Dr. Ochoa

## 2025-05-11 NOTE — PROGRESS NOTE ADULT - PROBLEM SELECTOR PLAN 5
out patient biopsy: benign results  f/u thyroid panel out patient biopsy: benign results  tsh wnl    -f/u thyroid panel

## 2025-05-12 ENCOUNTER — RESULT REVIEW (OUTPATIENT)
Age: 70
End: 2025-05-12

## 2025-05-12 ENCOUNTER — TRANSCRIPTION ENCOUNTER (OUTPATIENT)
Age: 70
End: 2025-05-12

## 2025-05-12 LAB
ANION GAP SERPL CALC-SCNC: 6 MMOL/L — SIGNIFICANT CHANGE UP (ref 5–17)
BUN SERPL-MCNC: 17 MG/DL — SIGNIFICANT CHANGE UP (ref 7–18)
CALCIUM SERPL-MCNC: 9 MG/DL — SIGNIFICANT CHANGE UP (ref 8.4–10.5)
CHLORIDE SERPL-SCNC: 109 MMOL/L — HIGH (ref 96–108)
CO2 SERPL-SCNC: 23 MMOL/L — SIGNIFICANT CHANGE UP (ref 22–31)
CREAT SERPL-MCNC: 0.76 MG/DL — SIGNIFICANT CHANGE UP (ref 0.5–1.3)
EGFR: 85 ML/MIN/1.73M2 — SIGNIFICANT CHANGE UP
EGFR: 85 ML/MIN/1.73M2 — SIGNIFICANT CHANGE UP
GLUCOSE SERPL-MCNC: 102 MG/DL — HIGH (ref 70–99)
HCT VFR BLD CALC: 39.2 % — SIGNIFICANT CHANGE UP (ref 34.5–45)
HGB BLD-MCNC: 14.3 G/DL — SIGNIFICANT CHANGE UP (ref 11.5–15.5)
MAGNESIUM SERPL-MCNC: 2.2 MG/DL — SIGNIFICANT CHANGE UP (ref 1.6–2.6)
MCHC RBC-ENTMCNC: 28.7 PG — SIGNIFICANT CHANGE UP (ref 27–34)
MCHC RBC-ENTMCNC: 36.5 G/DL — HIGH (ref 32–36)
MCV RBC AUTO: 78.6 FL — LOW (ref 80–100)
NRBC BLD AUTO-RTO: 0 /100 WBCS — SIGNIFICANT CHANGE UP (ref 0–0)
PHOSPHATE SERPL-MCNC: 3.7 MG/DL — SIGNIFICANT CHANGE UP (ref 2.5–4.5)
PLATELET # BLD AUTO: 269 K/UL — SIGNIFICANT CHANGE UP (ref 150–400)
POTASSIUM SERPL-MCNC: 3.8 MMOL/L — SIGNIFICANT CHANGE UP (ref 3.5–5.3)
POTASSIUM SERPL-SCNC: 3.8 MMOL/L — SIGNIFICANT CHANGE UP (ref 3.5–5.3)
RBC # BLD: 4.99 M/UL — SIGNIFICANT CHANGE UP (ref 3.8–5.2)
RBC # FLD: 13.8 % — SIGNIFICANT CHANGE UP (ref 10.3–14.5)
SODIUM SERPL-SCNC: 138 MMOL/L — SIGNIFICANT CHANGE UP (ref 135–145)
WBC # BLD: 6.6 K/UL — SIGNIFICANT CHANGE UP (ref 3.8–10.5)
WBC # FLD AUTO: 6.6 K/UL — SIGNIFICANT CHANGE UP (ref 3.8–10.5)

## 2025-05-12 PROCEDURE — 93306 TTE W/DOPPLER COMPLETE: CPT | Mod: 26

## 2025-05-12 PROCEDURE — 70553 MRI BRAIN STEM W/O & W/DYE: CPT | Mod: 26,76

## 2025-05-12 RX ADMIN — ENOXAPARIN SODIUM 40 MILLIGRAM(S): 100 INJECTION SUBCUTANEOUS at 11:42

## 2025-05-12 RX ADMIN — LEVETIRACETAM 1500 MILLIGRAM(S): 10 INJECTION, SOLUTION INTRAVENOUS at 11:41

## 2025-05-12 RX ADMIN — ATORVASTATIN CALCIUM 40 MILLIGRAM(S): 80 TABLET, FILM COATED ORAL at 21:32

## 2025-05-12 RX ADMIN — LEVETIRACETAM 1500 MILLIGRAM(S): 10 INJECTION, SOLUTION INTRAVENOUS at 21:32

## 2025-05-12 RX ADMIN — CYANOCOBALAMIN 1000 MICROGRAM(S): 1000 INJECTION INTRAMUSCULAR; SUBCUTANEOUS at 12:32

## 2025-05-12 RX ADMIN — DORZOLAMIDE 1 DROP(S): 20 SOLUTION/ DROPS OPHTHALMIC at 17:16

## 2025-05-12 RX ADMIN — DORZOLAMIDE 1 DROP(S): 20 SOLUTION/ DROPS OPHTHALMIC at 09:01

## 2025-05-12 RX ADMIN — LATANOPROST PF 1 DROP(S): 0.05 SOLUTION/ DROPS OPHTHALMIC at 21:34

## 2025-05-12 RX ADMIN — CLOPIDOGREL BISULFATE 75 MILLIGRAM(S): 75 TABLET, FILM COATED ORAL at 11:41

## 2025-05-12 RX ADMIN — Medication 81 MILLIGRAM(S): at 11:42

## 2025-05-12 NOTE — PHYSICAL THERAPY INITIAL EVALUATION ADULT - LEVEL OF INDEPENDENCE: GAIT, REHAB EVAL
~100 ft x 2 trials. Trial 1 CG, progressed to [place] for trial 2. Balance improved with visual fixation an worsened with head motion. Pt denied dizziness or spinning.

## 2025-05-12 NOTE — PHYSICAL THERAPY INITIAL EVALUATION ADULT - PERTINENT HX OF CURRENT PROBLEM, REHAB EVAL
69 y.o RT handed female presenting with ~10 day hx of imbalance. r/o CVA. Additional past medical history as detailed below by medical team.

## 2025-05-12 NOTE — PHYSICAL THERAPY INITIAL EVALUATION ADULT - DIAGNOSIS, PT EVAL
(ICF Model) Pt. present w/deficits in Body Structures/Function (Impairments), incl: Balance, coordination, leading to deficits in performing the below noted Activities (Limitations).

## 2025-05-12 NOTE — PROGRESS NOTE ADULT - PROBLEM SELECTOR PLAN 2
as above  DD: central vertigo 2/2 posterior circulation CVA vs hypothyroidism vs B12 deficiency  tsh wnl  -f/u B12 level  -c/w home med B12 suppliment as above  DD: central vertigo 2/2 posterior circulation CVA vs hypothyroidism vs B12 deficiency  tsh, b12 wnl    -c/w home med B12 supplement  -as above

## 2025-05-12 NOTE — DISCHARGE NOTE PROVIDER - NSDCFUADDAPPT_GEN_ALL_CORE_FT
APPTS ARE READY TO BE MADE: [X] YES    Best Family or Patient Contact (if needed):    Additional Information about above appointments (if needed):    1: Neurology Dr. Taylor  2: ENT Dr. Buchanan  3:     Other comments or requests:   APPTS ARE READY TO BE MADE: [X] YES    Best Family or Patient Contact (if needed):    Additional Information about above appointments (if needed):    1: Neurology Dr. Taylor  2: ENT Dr. Buchanan  3:     Other comments or requests:    Prior to outreaching the patient, it was visible that the patient has secured a follow up appointment which was not scheduled by our team. Patient is scheduled with Dr. Taylor 8/22 8:00am [address] [place] [address]. A task was sent to schedule sooner.    Appointment was scheduled by our team on the patient's behalf through the provider's office. Patient is scheduled with Dr. Olsen 5/22 12:00pm [address]    Appointment was scheduled by our team on the patient's behalf through the provider's office. Patient is scheduled with Dr. Buchanan 6/5 1:30pm [address] [place] [address]

## 2025-05-12 NOTE — PHYSICAL THERAPY INITIAL EVALUATION ADULT - LIVES WITH, PROFILE
private hoime ~8 stairs w/ b/l rail (can hold both at same time) to enter and 1 flight w/Rt rail ascending to access bedroom and bathroom on 2nd floor/spouse

## 2025-05-12 NOTE — PHYSICAL THERAPY INITIAL EVALUATION ADULT - GAIT DEVIATIONS NOTED, PT EVAL
decreased hero/increased time in double stance/decreased step length/decreased stride length/increased stride width/decreased weight-shifting ability

## 2025-05-12 NOTE — DISCHARGE NOTE PROVIDER - CARE PROVIDER_API CALL
22-Nov-2023 Izaiah Buchanan  Otolaryngology  [address] [place] [address]  Phone: [phone]  Fax: [phone]  Established Patient  Follow Up Time:     Yvonne Taylor)  Neurology  [address] [place] [address], [address] 2 [place] [address]  Phone: [phone]  Fax: [phone]  Established Patient  Follow Up Time:    Izaiah Buchanan  Otolaryngology  [address] [place] [address]  Phone: [phone]  Fax: [phone]  Established Patient  Follow Up Time:     Yvonne Taylor)  Neurology  [address] [facility], Floor 2 [place] [address]  Phone: [phone]  Fax: [phone]  Established Patient  Follow Up Time:     Magen Gomez  Cardiovascular Disease  [address] [place] [address], [address]  [place] [address]  Phone: [phone]  Fax: [phone]  Follow Up Time:

## 2025-05-12 NOTE — PROGRESS NOTE ADULT - PROBLEM SELECTOR PLAN 5
out patient biopsy: benign results  tsh wnl    -f/u thyroid panel out patient biopsy: benign results  tsh wnl

## 2025-05-12 NOTE — PHYSICAL THERAPY INITIAL EVALUATION ADULT - GENERAL OBSERVATIONS, REHAB EVAL
Consult received, chart reviewed. Patient received supine in bed, NAD, +tele. Patient agreed to EVALUATION from Physical Therapist. Denies any dizziness at rest, report marked improvement since prior day.

## 2025-05-12 NOTE — DISCHARGE NOTE PROVIDER - PROVIDER TOKENS
PROVIDER:[TOKEN:[[id number]],ESTABLISHEDPATIENT:[T]],PROVIDER:[TOKEN:[[id number]],ESTABLISHEDPATIENT:[T]] PROVIDER:[TOKEN:[[id number]],ESTABLISHEDPATIENT:[T]],PROVIDER:[TOKEN:[[id number]],ESTABLISHEDPATIENT:[T]],PROVIDER:[TOKEN:[[id number]]]

## 2025-05-12 NOTE — DISCHARGE NOTE PROVIDER - CARE PROVIDERS DIRECT ADDRESSES
[email or web address]

## 2025-05-12 NOTE — DISCHARGE NOTE PROVIDER - NSDCFUSCHEDAPPT_GEN_ALL_CORE_FT
Clover Doe  [facility]  END[address]CRIN [address]  Scheduled Appointment: 06/18/2025    Jenny Culp  [facility]  [facility] [address] [facility]  Scheduled Appointment: 07/08/2025

## 2025-05-12 NOTE — PROGRESS NOTE ADULT - ATTENDING COMMENTS
# ACUTE CVA VS. TIA  # ? VERTIGO  # HX OF CVA  # ? HX OF LUMBAR LAMINECTOMY  # DIZZINESS  - MONITOR ON TELEMETRY  - NOTED CT HEAD/NECK  - ASA, PLAVIX, STATIN  - NOTED TSH/LIPIDS/HBA1C  - F/U ECHOCARDIOGRAM W/ BUBBLE STUDY   - F/U MRI BRAIN  - NOTED PT EVAL  - NEUROLOGY CONSULT    # HX OF SEIZURE DISORDER    # HLD    # HX OF MULTINODULAR GOITER    # GLAUCOMA    # GI AND DVT PPX

## 2025-05-12 NOTE — DISCHARGE NOTE PROVIDER - NSDCCAREPROVSEEN_GEN_ALL_CORE_FT
Gabriela, Roman Ortiz, Cristian Lazarus, Mitch Hansen, Lor Canseco, Mariel Garland, Lindy Monae, Andreia Marrufo, Ethan Batista, Alejandra Perez, Mere

## 2025-05-12 NOTE — PROGRESS NOTE ADULT - PROBLEM SELECTOR PLAN 4
home med: atorvastatin 20  LDL 61  -c/w Atorvastatin 40 HS home med: atorvastatin 20  LDL 61    -c/w Atorvastatin 40 HS

## 2025-05-12 NOTE — DISCHARGE NOTE PROVIDER - NSDCCPCAREPLAN_GEN_ALL_CORE_FT
PRINCIPAL DISCHARGE DIAGNOSIS  Diagnosis: Ataxia  Assessment and Plan of Treatment: You came to the hospital due to 10 days of difficulty walking. You were assessed for a Cerebrovascular Accident (CVA aka stroke). CT head was negative for acute infarct or hemorrhage. MRI brain and internal auditory canal showed no masses or acute infarcts. Your EKG was normal. An Echocardiogram of your heart showed normal pumping function of the heart and no significant valvular abnormalities. Your B12 levels were normal. It is unlikely that your symptoms are due to a stroke.  You were seen by Neurologist whose recommendations were followed. The Neurologist recommends follow up with your ENT and Neurologist, as well was Vestibular Rehab. You were seen by physical therapist who recommended Outpatient PT.  Please take medications as prescribed and follow up with your ENT and Neurologist for further recommendations.      SECONDARY DISCHARGE DIAGNOSES  Diagnosis: HLD (hyperlipidemia)  Assessment and Plan of Treatment: You have history of Hyperlipidemia. In the hosptial, you were continued on your home statin medication. Please continue to take your medication at home as prescribed. Maintain healthy lifestyle, low fat diet, exercise regularly and check your lipid levels routinely.   Please follow up with your PCP in 1 week from discharge.    Diagnosis: Multinodular thyroid  Assessment and Plan of Treatment: You have a history of benign multinodular thyroid. Your TSH was normal.    Diagnosis: History of seizure  Assessment and Plan of Treatment: You have a history of seizure. You were continued on your home medications while in the hospital. Please continue to take your medications as prescribd at home and see your PCP within 1-2w.     PRINCIPAL DISCHARGE DIAGNOSIS  Diagnosis: Ataxia  Assessment and Plan of Treatment: You came to the hospital due to 10 days of difficulty walking. You were assessed for a Cerebrovascular Accident (CVA aka stroke). CT head was negative for acute infarct or hemorrhage. MRI brain and internal auditory canal showed no masses or acute infarcts. Your EKG was normal. Your B12 levels were normal. It is unlikely that your symptoms are due to a stroke.  You were seen by Neurologist whose recommendations were followed. The Neurologist recommends follow up with your ENT and Neurologist, as well was Vestibular Rehab. You were seen by physical therapist who recommended Outpatient PT.  Please take medications as prescribed and follow up with your ENT and Neurologist for further recommendations.  -  As part of your stroke workup, an Echocardiogram of your heart was performed which showed:  63% ejection fraction (normal), mild-moderate pulmonic valve regurgitation, +presence of intra-atrial shunt. This shunt is present in approximately 10% of people but allows blood clots from your deep veins to bypass your lungs if you are prone to vein clots. Please START TAKING Aspirin 81mg one tablet once per day and follow up with Cardiologist Dr. Gomez outpatient.      SECONDARY DISCHARGE DIAGNOSES  Diagnosis: HLD (hyperlipidemia)  Assessment and Plan of Treatment: You have history of Hyperlipidemia. In the hosptial, your dose of your statin medication was increased to a High Intensity dose. Please START TAKING Atorvastatin 40mg one tablet before bed.  Maintain healthy lifestyle, low fat diet, exercise regularly and check your lipid levels routinely.   Please follow up with your PCP in 1 week from discharge.    Diagnosis: Multinodular thyroid  Assessment and Plan of Treatment: You have a history of benign multinodular thyroid. Your TSH was normal.    Diagnosis: History of seizure  Assessment and Plan of Treatment: You have a history of seizure. You were continued on your home medications while in the hospital. Please continue to take your medications as prescribd at home and see your PCP within 1-2w.

## 2025-05-12 NOTE — PHYSICAL THERAPY INITIAL EVALUATION ADULT - ADDITIONAL COMMENTS
-- highly active prior to recent episode, does yoga and hiking  -- no falls in past 6 months  -- reports recent change in eyedrops prescription, but no other medication changes  --Reports occasional Lt foot and Lt hallux cramping/spasm, which pt discussed with neurologist.  -- reports currently able to ambulate to restroom and toilet w/o assist

## 2025-05-12 NOTE — DISCHARGE NOTE PROVIDER - HOSPITAL COURSE
69F pmhx of asymptomatic CVA, seizure, HLD, multinodular goitre. P/w presistent dizziness c/b broad based gait. NIHSS 2. CT A head and Neck: no acute signs of ischemia, hemorrhage. Patient is being admitted to tele for persistent dizziness and ataxic gait to rule out posterior circulation CVA.    Last known normal 10 days ago. H/o asymptomatic stroke was on prior use of ASA 81. Code stroke in ED with NIHSS 2 (bilateral LE ataxia). Pt not rTPA candidate due to: out of window. Monitored on tele. q4 neuro checks. Started on ASA qd, high intensity statin qhs, plavix x21 days if true stroke. b12 wnl. Neurology Dr. Ochoa consulted, rec mri, vestibular rehab. TTE with bubble showed ________________________. MRI head w/wout, IAC w/wout were negative for mass, acute infarct.    H/o seizure, continued on keppra 1500 BID.  H/o HLD, inc home dose of statin to 40 inpatient. LDL 61.  H/o benign Multinodular thyroid. TSH wnl.  DVT with lovenox.  PT consulted, recommended outpatient PT. Neurology recommended vestibular rehab.  Patient is stable for discharge per attending and is advised to follow up with PCP as outpatient.   69F pmhx of asymptomatic CVA, seizure, HLD, multinodular goitre. P/w presistent dizziness c/b broad based gait. NIHSS 2. CT A head and Neck: no acute signs of ischemia, hemorrhage. Patient is being admitted to tele for persistent dizziness and ataxic gait to rule out posterior circulation CVA.    Last known normal 10 days ago. H/o asymptomatic stroke was on prior use of ASA 81. Code stroke in ED with NIHSS 2 (bilateral LE ataxia). Pt not rTPA candidate due to: out of window. Monitored on tele. q4 neuro checks. Started on ASA qd, high intensity statin qhs, plavix x21 days if true stroke. b12 wnl. Neurology Dr. Ochoa consulted, rec mri, vestibular rehab. TTE with bubble showed 63% EF, mild-moderate OK, +presence of intra-atrial shunt. MRI head w/wout, IAC w/wout were negative for mass, acute infarct.    H/o seizure, continued on keppra 1500 BID.  H/o HLD, inc home dose of statin to 40 inpatient. LDL 61.  H/o benign Multinodular thyroid. TSH wnl.  DVT with lovenox.  PT consulted, recommended outpatient PT. Neurology recommended vestibular rehab.  Patient is stable for discharge per attending and is advised to follow up with PCP as outpatient.

## 2025-05-12 NOTE — PROGRESS NOTE ADULT - SUBJECTIVE AND OBJECTIVE BOX
SUBJECTIVE: **TO UPDATE**  No overnight events. Pt seen at bedside, states that they feel "__________." Pt endorses _____. Pt denies headache, fever, chills, SOB, chest pain, cough, abd pain, n/v/d, constipation, dysuria, urinary frequency, back pain, myalgias, weakness, dizziness, gait disturbance, numbness/tingling, blurry vision.      OBJECTIVE:    T(C): 36.4 (05-12-25 @ 05:18), Max: 37.3 (05-11-25 @ 20:43)  HR: 58 (05-12-25 @ 05:18) (50 - 66)  BP: 103/58 (05-12-25 @ 05:18) (103/58 - 128/59)  RR: 18 (05-12-25 @ 05:18) (18 - 18)  SpO2: 98% (05-12-25 @ 05:18) (98% - 100%)            ***TO BE EDITED***  CONSTITUTIONAL: No apparent distress, resting comfortably  EYES: Pupils symmetric, EOMI, No conjunctival or scleral injection, non-icteric  ENMT: Oral mucosa with moist membranes  RESP: No respiratory distress, no use of accessory muscles; CTA b/l, no crackles or wheezes  CV: RRR, +S1S2, no murmurs appreciated; no peripheral edema  GI: Soft, NTND, no rebound, no guarding  MSK: No digital clubbing or cyanosis; Extremities moving equally without additional effort; gait not appreciated  : deferred  SKIN: No rashes or ulcers noted  NEURO: CN II-XII grossly intact   PSYCH: Appropriate insight/judgment; A+O x 3, mood and affect appropriate, recent/remote memory intact    Bactrim (Hives)      acetaminophen     Tablet .. 650 milliGRAM(s) Oral every 6 hours PRN  aspirin  chewable 81 milliGRAM(s) Oral daily  atorvastatin 40 milliGRAM(s) Oral at bedtime  clopidogrel Tablet 75 milliGRAM(s) Oral daily  cyanocobalamin 1000 MICROGram(s) Oral daily  dorzolamide 2% Ophthalmic Solution 1 Drop(s) Both EYES <User Schedule>  enoxaparin Injectable 40 milliGRAM(s) SubCutaneous every 24 hours  latanoprost 0.005% Ophthalmic Solution 1 Drop(s) Both EYES at bedtime  levETIRAcetam 1500 milliGRAM(s) Oral <User Schedule>                            14.3   6.60  )-----------( 269      ( 12 May 2025 05:31 )             39.2     05-12    138  |  109[H]  |  17  ----------------------------<  102[H]  3.8   |  23  |  0.76    Ca    9.0      12 May 2025 05:31  Phos  3.7     05-12  Mg     2.2     05-12    Lipid Profile in AM (05.11.25 @ 06:01)   Cholesterol: 179 mg/dL  Triglycerides, Serum: 41 mg/dL  HDL Cholesterol: 109 mg/dL  Non HDL Cholesterol: 70  LDL Cholesterol Calculated: 61 mg/dL    A1C with Estimated Average Glucose in AM (05.11.25 @ 06:01)   A1C with Estimated Average Glucose Result: 5.3:           SUBJECTIVE:  No overnight events. Pt seen at bedside, expressed frustration about delay in TTE, MRI. Pt denied change in her sx.      OBJECTIVE:    T(C): 36.4 (05-12-25 @ 05:18), Max: 37.3 (05-11-25 @ 20:43)  HR: 58 (05-12-25 @ 05:18) (50 - 66)  BP: 103/58 (05-12-25 @ 05:18) (103/58 - 128/59)  RR: 18 (05-12-25 @ 05:18) (18 - 18)  SpO2: 98% (05-12-25 @ 05:18) (98% - 100%)            CONSTITUTIONAL: No apparent distress, resting comfortably  EYES: No nystagmus; Pupils symmetric, EOMI, No conjunctival or scleral injection, non-icteric  ENMT: Oral mucosa with moist membranes  RESP: No respiratory distress, no use of accessory muscles; CTA b/l, no crackles or wheezes  CV: RRR, +S1S2, no murmurs appreciated; no peripheral edema  GI: Soft, NTND, no rebound, no guarding  MSK: No digital clubbing or cyanosis; Extremities moving equally without additional effort; gait not appreciated  : deferred  SKIN: No rashes or ulcers noted  NEURO: CN II-XII grossly intact, strength/sensation intact and equal b/l.  PSYCH: Appropriate insight/judgment; A+O x 3, mood and affect appropriate, recent/remote memory intact    Bactrim (Hives)      acetaminophen     Tablet .. 650 milliGRAM(s) Oral every 6 hours PRN  aspirin  chewable 81 milliGRAM(s) Oral daily  atorvastatin 40 milliGRAM(s) Oral at bedtime  clopidogrel Tablet 75 milliGRAM(s) Oral daily  cyanocobalamin 1000 MICROGram(s) Oral daily  dorzolamide 2% Ophthalmic Solution 1 Drop(s) Both EYES <User Schedule>  enoxaparin Injectable 40 milliGRAM(s) SubCutaneous every 24 hours  latanoprost 0.005% Ophthalmic Solution 1 Drop(s) Both EYES at bedtime  levETIRAcetam 1500 milliGRAM(s) Oral <User Schedule>                            14.3   6.60  )-----------( 269      ( 12 May 2025 05:31 )             39.2     05-12    138  |  109[H]  |  17  ----------------------------<  102[H]  3.8   |  23  |  0.76    Ca    9.0      12 May 2025 05:31  Phos  3.7     05-12  Mg     2.2     05-12    Lipid Profile in AM (05.11.25 @ 06:01)   Cholesterol: 179 mg/dL  Triglycerides, Serum: 41 mg/dL  HDL Cholesterol: 109 mg/dL  Non HDL Cholesterol: 70  LDL Cholesterol Calculated: 61 mg/dL    A1C with Estimated Average Glucose in AM (05.11.25 @ 06:01)   A1C with Estimated Average Glucose Result: 5.3:

## 2025-05-12 NOTE — DISCHARGE NOTE PROVIDER - NSDCMRMEDTOKEN_GEN_ALL_CORE_FT
atorvastatin 20 mg oral tablet: 1 tab(s) orally once a day (at bedtime)  cyanocobalamin 100 mcg oral tablet: 1 tab(s) orally once a day  dorzolamide 2% ophthalmic solution: 1 drop(s) in each eye 2 times a day  Keppra 750 mg oral tablet: 2 tab(s) orally 2 times a day  latanoprost 0.005% ophthalmic solution: 1 drop(s) in each eye once a day  Rhopressa 0.02% ophthalmic solution: 1 drop(s) in each eye once a day  zinc (as acetate) 50 mg oral capsule: 1 cap(s) orally 2 times a day   atorvastatin 20 mg oral tablet: 1 tab(s) orally once a day (at bedtime)  cyanocobalamin 100 mcg oral tablet: 1 tab(s) orally once a day  dorzolamide 2% ophthalmic solution: 1 drop(s) in each eye 2 times a day  Keppra 750 mg oral tablet: 2 tab(s) orally 2 times a day  latanoprost 0.005% ophthalmic solution: 1 drop(s) in each eye once a day  meclizine 12.5 mg oral tablet: 1 tab(s) orally 3 times a day as needed for  dizziness  Rhopressa 0.02% ophthalmic solution: 1 drop(s) in each eye once a day  Vestibular Rehabilitation: Vestibular Rehabilitation  zinc (as acetate) 50 mg oral capsule: 1 cap(s) orally 2 times a day   aspirin 81 mg oral tablet, chewable: 1 tab(s) orally once a day  atorvastatin 40 mg oral tablet: 1 tab(s) orally once a day (at bedtime)  cyanocobalamin 100 mcg oral tablet: 1 tab(s) orally once a day  dorzolamide 2% ophthalmic solution: 1 drop(s) in each eye 2 times a day  Keppra 750 mg oral tablet: 2 tab(s) orally 2 times a day  latanoprost 0.005% ophthalmic solution: 1 drop(s) in each eye once a day  meclizine 12.5 mg oral tablet: 1 tab(s) orally 3 times a day as needed for  dizziness  Rhopressa 0.02% ophthalmic solution: 1 drop(s) in each eye once a day  zinc (as acetate) 50 mg oral capsule: 1 cap(s) orally 2 times a day   aspirin 81 mg oral tablet, chewable: 1 tab(s) orally once a day  atorvastatin 40 mg oral tablet: 1 tab(s) orally once a day (at bedtime)  cyanocobalamin 100 mcg oral tablet: 1 tab(s) orally once a day  dorzolamide 2% ophthalmic solution: 1 drop(s) in each eye 2 times a day  Keppra 750 mg oral tablet: 2 tab(s) orally 2 times a day  latanoprost 0.005% ophthalmic solution: 1 drop(s) in each eye once a day  meclizine 25 mg oral tablet: 1 tab(s) orally 3 times a day as needed for  dizziness  Rhopressa 0.02% ophthalmic solution: 1 drop(s) in each eye once a day  zinc (as acetate) 50 mg oral capsule: 1 cap(s) orally 2 times a day

## 2025-05-12 NOTE — PROGRESS NOTE ADULT - PROBLEM SELECTOR PLAN 1
p/w persistent dizziness with wide based gait for 10 days with "boat rocking" sensation. denies room spinning sensation.  prior hx of asymptomatic stroke was on prior use of ASA 81  w/ last known normal 10 days ago  code stroke in ED with NIHSS 2 (bilateral LE ataxia)  CT A head and neck, stroke protocol showed no signs of acute ischemia, hemorrhage or solid mass. patent vasculature.   Pt not rTPA candidate due to: out of window  -Tele monitoring  -ASA qd, high intensity statin qhs, plavix x21 days if true stroke  -F/U Echo with bubble study  -Monitor BP - allow permissive htn x24hr from last known normal  -PT consulted  -MRI head w/wout, IAC w/wout  -Neuro checks q4  -Neuro Consulted: Dr. Ochoa p/w persistent dizziness with wide based gait for 10 days with "boat rocking" sensation. denies room spinning sensation.  prior hx of asymptomatic stroke was on prior use of ASA 81  w/ last known normal 10 days ago  code stroke in ED with NIHSS 2 (bilateral LE ataxia)  CT A head and neck, stroke protocol showed no signs of acute ischemia, hemorrhage or solid mass. patent vasculature.   Pt not rTPA candidate due to: out of window    -Tele monitoring  -ASA qd, high intensity statin qhs, plavix x21 days if true stroke  -F/U Echo with bubble study  -PT consulted  -MRI head w/wout, IAC w/wout  -Neuro checks q4  -Neuro Consulted: Dr. Ochoa

## 2025-05-12 NOTE — PHYSICAL THERAPY INITIAL EVALUATION ADULT - FUNCTIONAL LIMITATIONS, PT EVAL
MODIFIED JAVON SCALE: 2: Slight Disability: Unable to carry out all previous activities, but able to look after own affairs without assistance./community/leisure

## 2025-05-13 ENCOUNTER — NON-APPOINTMENT (OUTPATIENT)
Age: 70
End: 2025-05-13

## 2025-05-13 ENCOUNTER — TRANSCRIPTION ENCOUNTER (OUTPATIENT)
Age: 70
End: 2025-05-13

## 2025-05-13 VITALS — HEART RATE: 66 BPM | SYSTOLIC BLOOD PRESSURE: 131 MMHG | OXYGEN SATURATION: 99 % | DIASTOLIC BLOOD PRESSURE: 75 MMHG

## 2025-05-13 PROCEDURE — 83880 ASSAY OF NATRIURETIC PEPTIDE: CPT

## 2025-05-13 PROCEDURE — 83036 HEMOGLOBIN GLYCOSYLATED A1C: CPT

## 2025-05-13 PROCEDURE — 85730 THROMBOPLASTIN TIME PARTIAL: CPT

## 2025-05-13 PROCEDURE — 86901 BLOOD TYPING SEROLOGIC RH(D): CPT

## 2025-05-13 PROCEDURE — 85025 COMPLETE CBC W/AUTO DIFF WBC: CPT

## 2025-05-13 PROCEDURE — 70496 CT ANGIOGRAPHY HEAD: CPT

## 2025-05-13 PROCEDURE — 80048 BASIC METABOLIC PNL TOTAL CA: CPT

## 2025-05-13 PROCEDURE — 99285 EMERGENCY DEPT VISIT HI MDM: CPT

## 2025-05-13 PROCEDURE — 82607 VITAMIN B-12: CPT

## 2025-05-13 PROCEDURE — 70553 MRI BRAIN STEM W/O & W/DYE: CPT

## 2025-05-13 PROCEDURE — 70498 CT ANGIOGRAPHY NECK: CPT

## 2025-05-13 PROCEDURE — 84443 ASSAY THYROID STIM HORMONE: CPT

## 2025-05-13 PROCEDURE — 85610 PROTHROMBIN TIME: CPT

## 2025-05-13 PROCEDURE — 93306 TTE W/DOPPLER COMPLETE: CPT

## 2025-05-13 PROCEDURE — 84100 ASSAY OF PHOSPHORUS: CPT

## 2025-05-13 PROCEDURE — A9585: CPT

## 2025-05-13 PROCEDURE — 80061 LIPID PANEL: CPT

## 2025-05-13 PROCEDURE — 97116 GAIT TRAINING THERAPY: CPT

## 2025-05-13 PROCEDURE — 83735 ASSAY OF MAGNESIUM: CPT

## 2025-05-13 PROCEDURE — 86900 BLOOD TYPING SEROLOGIC ABO: CPT

## 2025-05-13 PROCEDURE — 85027 COMPLETE CBC AUTOMATED: CPT

## 2025-05-13 PROCEDURE — 99233 SBSQ HOSP IP/OBS HIGH 50: CPT | Mod: FS

## 2025-05-13 PROCEDURE — 84484 ASSAY OF TROPONIN QUANT: CPT

## 2025-05-13 PROCEDURE — 36415 COLL VENOUS BLD VENIPUNCTURE: CPT

## 2025-05-13 PROCEDURE — 81003 URINALYSIS AUTO W/O SCOPE: CPT

## 2025-05-13 PROCEDURE — 80053 COMPREHEN METABOLIC PANEL: CPT

## 2025-05-13 PROCEDURE — 86850 RBC ANTIBODY SCREEN: CPT

## 2025-05-13 PROCEDURE — 93005 ELECTROCARDIOGRAM TRACING: CPT

## 2025-05-13 PROCEDURE — 97162 PT EVAL MOD COMPLEX 30 MIN: CPT

## 2025-05-13 RX ORDER — ATORVASTATIN CALCIUM 80 MG/1
1 TABLET, FILM COATED ORAL
Refills: 0 | DISCHARGE

## 2025-05-13 RX ORDER — MECLIZINE HCL 12.5 MG
1 TABLET ORAL
Qty: 21 | Refills: 0
Start: 2025-05-13 | End: 2025-05-19

## 2025-05-13 RX ORDER — MECLIZINE HCL 12.5 MG
1 TABLET ORAL
Qty: 9 | Refills: 0
Start: 2025-05-13 | End: 2025-05-15

## 2025-05-13 RX ORDER — ATORVASTATIN CALCIUM 80 MG/1
1 TABLET, FILM COATED ORAL
Qty: 30 | Refills: 0
Start: 2025-05-13 | End: 2025-06-11

## 2025-05-13 RX ORDER — ASPIRIN 325 MG
1 TABLET ORAL
Qty: 30 | Refills: 0
Start: 2025-05-13 | End: 2025-06-11

## 2025-05-13 RX ADMIN — DORZOLAMIDE 1 DROP(S): 20 SOLUTION/ DROPS OPHTHALMIC at 05:35

## 2025-05-13 RX ADMIN — LEVETIRACETAM 1500 MILLIGRAM(S): 10 INJECTION, SOLUTION INTRAVENOUS at 13:29

## 2025-05-13 RX ADMIN — CYANOCOBALAMIN 1000 MICROGRAM(S): 1000 INJECTION INTRAMUSCULAR; SUBCUTANEOUS at 13:11

## 2025-05-13 RX ADMIN — Medication 81 MILLIGRAM(S): at 13:11

## 2025-05-13 RX ADMIN — ENOXAPARIN SODIUM 40 MILLIGRAM(S): 100 INJECTION SUBCUTANEOUS at 13:12

## 2025-05-13 NOTE — PROGRESS NOTE ADULT - SUBJECTIVE AND OBJECTIVE BOX
NEUROLOGY FOLLOW-UP CONSULT NOTE      Interval history: No acute neurologic events overnight. reports improved dizziness but still not completely resolved, better since admission.     Meds:  MEDICATIONS  (STANDING):  aspirin  chewable 81 milliGRAM(s) Oral daily  atorvastatin 40 milliGRAM(s) Oral at bedtime  cyanocobalamin 1000 MICROGram(s) Oral daily  dorzolamide 2% Ophthalmic Solution 1 Drop(s) Both EYES <User Schedule>  enoxaparin Injectable 40 milliGRAM(s) SubCutaneous every 24 hours  latanoprost 0.005% Ophthalmic Solution 1 Drop(s) Both EYES at bedtime  levETIRAcetam 1500 milliGRAM(s) Oral <User Schedule>    MEDICATIONS  (PRN):  acetaminophen     Tablet .. 650 milliGRAM(s) Oral every 6 hours PRN Temp greater or equal to 38C (100.4F), Mild Pain (1 - 3)      PMHx/PSHx/FHx/SHx:  Ataxia  Chronic back pain greater than 3 months duration  Raynauds phenomenon  Seizure  CVA (cerebrovascular accident)  HLD (hyperlipidemia)  Ataxia  Abnormal gait  Multinodular thyroid  History of D&C  H/O colonoscopy      Allergies:  Bactrim (Hives)      ROS: All systems negative except as documented in Interval history    O:  T(C): 36.4 (05-13-25 @ 07:28), Max: 36.9 (05-12-25 @ 23:42)  HR: 52 (05-13-25 @ 07:28) (52 - 66)  BP: 127/60 (05-13-25 @ 07:28) (113/68 - 147/71)  RR: 18 (05-13-25 @ 07:28) (16 - 18)  SpO2: 99% (05-13-25 @ 07:28) (98% - 100%)    Focused neurologic exam:  MS - AAO x3, speech fluent, rep/naming intact, follows commands, attn/conc/recent and remote memory/fund of knowledge WNL  CN - PERRLA, EOMI, VFF, face sens/str/hearing WNL b/l, tongue midline.   Motor - Normal bulk/tone, 5/5 all  Sens - LT intact all  Coord - FtN intact b/l  Gait and station - Wide based stance and gait, dizzy and off balance when attempts o walk with narrow gait.     Pertinent labs/studies:                          14.3   6.60  )-----------( 269      ( 12 May 2025 05:31 )             39.2     05-12    138  |  109[H]  |  17  ----------------------------<  102[H]  3.8   |  23  |  0.76    Ca    9.0      12 May 2025 05:31  Phos  3.7     05-12  Mg     2.2     05-12      RADIOLOGY AND ADDITIONAL STUDIES:      MR IAC w/wo IV Cont (05.12.25 @ 18:00)   IMPRESSION:  No evidence of vestibular schwannoma.    No evidence for acute territorial infarct, acute intracranial hemorrhage,   or midline shift.      CT Head No Cont (04.04.22 @ 17:06)   IMPRESSION:  Normal non-contrast CT of the brain.    CT Angio Neck w/ IV Cont (05.10.25 @ 09:57)   IMPRESSION:  1. Unremarkable CT study of the brain. Clear sinuses and mastoids.  2. Neck vasculature is widely patent, but there is a lateral displacement   of both common carotid arteries by a diffusely enlarged and heterogeneous   thyroid gland indicative of multinodular goiter.  3. Intracerebral vasculature is widely patent, with no focal stenosis,   dissection, or occlusion.  4. Venous sinuses are patent intracranially.           NEUROLOGY FOLLOW-UP CONSULT NOTE      Interval history: No acute neurologic events overnight. Reports dizziness has gotten better since admission, still w/ some positional dizziness when changing position from supine to sitting and standing.     Meds:  MEDICATIONS  (STANDING):  aspirin  chewable 81 milliGRAM(s) Oral daily  atorvastatin 40 milliGRAM(s) Oral at bedtime  cyanocobalamin 1000 MICROGram(s) Oral daily  dorzolamide 2% Ophthalmic Solution 1 Drop(s) Both EYES <User Schedule>  enoxaparin Injectable 40 milliGRAM(s) SubCutaneous every 24 hours  latanoprost 0.005% Ophthalmic Solution 1 Drop(s) Both EYES at bedtime  levETIRAcetam 1500 milliGRAM(s) Oral <User Schedule>    MEDICATIONS  (PRN):  acetaminophen     Tablet .. 650 milliGRAM(s) Oral every 6 hours PRN Temp greater or equal to 38C (100.4F), Mild Pain (1 - 3)      PMHx/PSHx/FHx/SHx:  Ataxia  Chronic back pain greater than 3 months duration  Raynauds phenomenon  Seizure  CVA (cerebrovascular accident)  HLD (hyperlipidemia)  Ataxia  Abnormal gait  Multinodular thyroid  History of D&C  H/O colonoscopy      Allergies:  Bactrim (Hives)      ROS: All systems negative except as documented in Interval history    O:  T(C): 36.4 (05-13-25 @ 07:28), Max: 36.9 (05-12-25 @ 23:42)  HR: 52 (05-13-25 @ 07:28) (52 - 66)  BP: 127/60 (05-13-25 @ 07:28) (113/68 - 147/71)  RR: 18 (05-13-25 @ 07:28) (16 - 18)  SpO2: 99% (05-13-25 @ 07:28) (98% - 100%)    Focused neurologic exam:  MS - AAO x3, speech fluent, rep/naming intact, follows commands, attn/conc/recent and remote memory/fund of knowledge WNL  CN - PERRLA, EOMI, VFF, face sens/str/hearing WNL b/l, tongue midline.   Motor - Normal bulk/tone, 5/5 all  Sens - LT intact all  Coord - FtN intact b/l  Gait and station - Wide based stance and gait, dizzy and off balance when attempts o walk with narrow gait.     Pertinent labs/studies:                          14.3   6.60  )-----------( 269      ( 12 May 2025 05:31 )             39.2     05-12    138  |  109[H]  |  17  ----------------------------<  102[H]  3.8   |  23  |  0.76    Ca    9.0      12 May 2025 05:31  Phos  3.7     05-12  Mg     2.2     05-12      RADIOLOGY AND ADDITIONAL STUDIES:      MR IAC w/wo IV Cont (05.12.25 @ 18:00)   IMPRESSION:  No evidence of vestibular schwannoma.    No evidence for acute territorial infarct, acute intracranial hemorrhage,   or midline shift.      CT Head No Cont (04.04.22 @ 17:06)   IMPRESSION:  Normal non-contrast CT of the brain.    CT Angio Neck w/ IV Cont (05.10.25 @ 09:57)   IMPRESSION:  1. Unremarkable CT study of the brain. Clear sinuses and mastoids.  2. Neck vasculature is widely patent, but there is a lateral displacement   of both common carotid arteries by a diffusely enlarged and heterogeneous   thyroid gland indicative of multinodular goiter.  3. Intracerebral vasculature is widely patent, with no focal stenosis,   dissection, or occlusion.  4. Venous sinuses are patent intracranially.

## 2025-05-13 NOTE — PROGRESS NOTE ADULT - PROBLEM SELECTOR PLAN 2
as above  DD: central vertigo 2/2 posterior circulation CVA vs hypothyroidism vs B12 deficiency  tsh, b12 wnl    -c/w home med B12 supplement  -as above

## 2025-05-13 NOTE — PROGRESS NOTE ADULT - NUTRITIONAL ASSESSMENT
Diet, Regular (05-10-25 @ 14:07) [Active]

## 2025-05-13 NOTE — PROGRESS NOTE ADULT - NS ATTEND AMEND GEN_ALL_CORE FT
Mrs. 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 Taylor. Also with hx of HLD, multinodular goiter, Glaucoma, and questionable bilateral tiny BG lancunes (past MRI looks normal to my read). She is presenting with subacute vestibular syndrome, namely lateral translational vertigo rather than rotational. Exam is normal. Stroke seems unlikely given normal MRI despite patient symptomatic for 10 days. Patient mostly without risk factors. I dont believe she has had old strokes.     If not stroke, would consider vestibular pathology, particularly within the saccule vs utricle, such as with labyrinthitis though patient without any viral prodrome. Symptoms have improved somewhat while here. Expect to eventually resolve.      - PT eval and treat  - likely could use referral for vestibular rehab on discharge  - patient prefers to decline meclizine at this time  - continue home Keppra 1500 mg BID  - follow up with outpatient neurology, Dr. Taylor

## 2025-05-13 NOTE — PROGRESS NOTE ADULT - PROBLEM SELECTOR PLAN 6
dvt ppx: lovenox   fall risk  diet: reg

## 2025-05-13 NOTE — PROGRESS NOTE ADULT - SUBJECTIVE AND OBJECTIVE BOX
SUBJECTIVE: **TO UPDATE**  No overnight events. Pt seen at bedside, states that they feel "__________." Pt endorses _____. Pt denies headache, fever, chills, SOB, chest pain, cough, abd pain, n/v/d, constipation, dysuria, urinary frequency, back pain, myalgias, weakness, dizziness, gait disturbance, numbness/tingling, blurry vision.      OBJECTIVE:    T(C): 36.4 (05-13-25 @ 07:28), Max: 36.9 (05-12-25 @ 23:42)  HR: 52 (05-13-25 @ 07:28) (52 - 66)  BP: 127/60 (05-13-25 @ 07:28) (113/68 - 147/71)  RR: 18 (05-13-25 @ 07:28) (16 - 18)  SpO2: 99% (05-13-25 @ 07:28) (98% - 100%)            ***TO BE EDITED***  CONSTITUTIONAL: No apparent distress, resting comfortably  EYES: Pupils symmetric, EOMI, No conjunctival or scleral injection, non-icteric  ENMT: Oral mucosa with moist membranes  RESP: No respiratory distress, no use of accessory muscles; CTA b/l, no crackles or wheezes  CV: RRR, +S1S2, no murmurs appreciated; no peripheral edema  GI: Soft, NTND, no rebound, no guarding  MSK: No digital clubbing or cyanosis; Extremities moving equally without additional effort; gait not appreciated  : deferred  SKIN: No rashes or ulcers noted  NEURO: CN II-XII grossly intact   PSYCH: Appropriate insight/judgment; A+O x 3, mood and affect appropriate, recent/remote memory intact    Bactrim (Hives)      acetaminophen     Tablet .. 650 milliGRAM(s) Oral every 6 hours PRN  aspirin  chewable 81 milliGRAM(s) Oral daily  atorvastatin 40 milliGRAM(s) Oral at bedtime  clopidogrel Tablet 75 milliGRAM(s) Oral daily  cyanocobalamin 1000 MICROGram(s) Oral daily  dorzolamide 2% Ophthalmic Solution 1 Drop(s) Both EYES <User Schedule>  enoxaparin Injectable 40 milliGRAM(s) SubCutaneous every 24 hours  latanoprost 0.005% Ophthalmic Solution 1 Drop(s) Both EYES at bedtime  levETIRAcetam 1500 milliGRAM(s) Oral <User Schedule>

## 2025-05-13 NOTE — DISCHARGE NOTE NURSING/CASE MANAGEMENT/SOCIAL WORK - PATIENT PORTAL LINK FT
You can access the FollowMyHealth Patient Portal offered by [facility] by registering at the following website: [email or web address]. By joining [facility]’s FollowMyHealth portal, you will also be able to view your health information using other applications (apps) compatible with our system.

## 2025-05-13 NOTE — DISCHARGE NOTE NURSING/CASE MANAGEMENT/SOCIAL WORK - NSDCFUADDAPPT_GEN_ALL_CORE_FT
APPTS ARE READY TO BE MADE: [X] YES    Best Family or Patient Contact (if needed):    Additional Information about above appointments (if needed):    1: Neurology Dr. Taylor  2: ENT Dr. Buchanan  3:     Other comments or requests:

## 2025-05-13 NOTE — PROGRESS NOTE ADULT - ASSESSMENT
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 Taylor as outpt, Hx of HLD, multinodular Goiter, Glaucoma, ? B/L BG lacunes P/W acute onset and persisting dizziness and off balance X 10days. Initial imaging unrevealing. PT admitted for further W/U of dizziness and neuro consulted for the same.     Impression: Given acute onset and persistent dizziness, stroke was in the differential Dx. MRI Brain and IAC W/WO negative fro any acute intracranial pathology which is reassuring and would consider peripheral etiology like labyrinthitis without any viral prodrome.       Reccs:   - Outpt Vestibular Rehab  - Outpt ENT F/U  - F/U with her outpt Neurologist Dr. Taylor in 2-4 weeks.  - Can try short course of Meclizine trial if pt prefers.   - No Neuro contraindication for discharge if pt is doing well other wise medically.     Informed Primary team Dr. Hurst.      Pt seen, eval and D/W neuro attending Dr. Ochoa.                 Mrs. 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 Taylor. Also with hx of HLD, multinodular goiter, Glaucoma, and questionable bilateral tiny BG lancunes (past MRI looks normal to my read). She is presenting with subacute vestibular syndrome, namely lateral translational vertigo rather than rotational. Exam is normal. Given acute onset and persistence over 10 days, would rule out stroke. Patient mostly without risk factors. I dont believe she has had old strokes. If not stroke, would consider vestibular pathology, particularly with the sacule vs utricle. Could consider labrynthitis though patient without any viral prodrome.       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 Taylor as outpt, Hx of HLD, multinodular Goiter, Glaucoma, ? B/L BG lacunes P/W acute onset and persisting dizziness and off balance X 10days. Initial imaging unrevealing. PT admitted for further W/U of dizziness and neuro consulted for the same.     Impression: Given acute onset and persistent dizziness, stroke was in the differential Dx. MRI Brain and IAC W/WO negative fro any acute intracranial pathology which is reassuring and would consider peripheral etiology like labyrinthitis without any viral prodrome.     Reccs:   - Outpt Vestibular Rehab  - Outpt ENT F/U  - F/U with her outpt Neurologist Dr. Taylor in 2-4 weeks.  - Can try short course of Meclizine trial if pt prefers.   - No Neuro contraindication for discharge if pt is doing well other wise medically.     Informed Primary team Dr. Hurst.    Pt seen, eval and D/W neuro attending Dr. Ochoa.

## 2025-05-13 NOTE — DISCHARGE NOTE NURSING/CASE MANAGEMENT/SOCIAL WORK - FINANCIAL ASSISTANCE
[facility] provides services at a reduced cost to those who are determined to be eligible through [facility]’s financial assistance program. Information regarding [facility]’s financial assistance program can be found by going to [email or web address] or by calling [phone].

## 2025-05-13 NOTE — PROGRESS NOTE ADULT - PROBLEM SELECTOR PLAN 1
p/w persistent dizziness with wide based gait for 10 days with "boat rocking" sensation. denies room spinning sensation.  prior hx of asymptomatic stroke was on prior use of ASA 81  w/ last known normal 10 days ago  code stroke in ED with NIHSS 2 (bilateral LE ataxia)  CT A head and neck, stroke protocol showed no signs of acute ischemia, hemorrhage or solid mass. patent vasculature.   Pt not rTPA candidate due to: out of window    -Tele monitoring  -ASA qd, high intensity statin qhs, plavix x21 days if true stroke  -F/U Echo with bubble study  -PT consulted  -MRI head w/wout, IAC w/wout  -Neuro checks q4  -Neuro Consulted: Dr. Ochoa

## 2025-05-14 ENCOUNTER — NON-APPOINTMENT (OUTPATIENT)
Age: 70
End: 2025-05-14

## 2025-05-14 PROBLEM — I63.9 CEREBRAL INFARCTION, UNSPECIFIED: Chronic | Status: ACTIVE | Noted: 2025-05-10

## 2025-05-14 PROBLEM — E78.5 HYPERLIPIDEMIA, UNSPECIFIED: Chronic | Status: ACTIVE | Noted: 2025-05-10

## 2025-05-19 DIAGNOSIS — H81.90 UNSPECIFIED DISORDER OF VESTIBULAR FUNCTION, UNSPECIFIED EAR: ICD-10-CM

## 2025-05-20 ENCOUNTER — APPOINTMENT (OUTPATIENT)
Dept: NEUROLOGY | Facility: CLINIC | Age: 70
End: 2025-05-20
Payer: MEDICARE

## 2025-05-20 VITALS
SYSTOLIC BLOOD PRESSURE: 147 MMHG | OXYGEN SATURATION: 95 % | BODY MASS INDEX: 22.72 KG/M2 | HEIGHT: 65 IN | WEIGHT: 136.38 LBS | TEMPERATURE: 98 F | DIASTOLIC BLOOD PRESSURE: 78 MMHG | HEART RATE: 61 BPM

## 2025-05-20 PROCEDURE — 99496 TRANSJ CARE MGMT HIGH F2F 7D: CPT

## 2025-05-20 RX ORDER — DIVALPROEX SODIUM 500 1/1
500 TABLET, EXTENDED RELEASE ORAL
Qty: 30 | Refills: 1 | Status: ACTIVE | COMMUNITY
Start: 2025-05-20 | End: 1900-01-01

## 2025-05-24 LAB — LEVETIRACETAM SERPL-MCNC: 34.8 UG/ML

## 2025-06-02 ENCOUNTER — TRANSCRIPTION ENCOUNTER (OUTPATIENT)
Age: 70
End: 2025-06-02

## 2025-06-05 ENCOUNTER — APPOINTMENT (OUTPATIENT)
Dept: OTOLARYNGOLOGY | Facility: CLINIC | Age: 70
End: 2025-06-05

## 2025-06-13 ENCOUNTER — NON-APPOINTMENT (OUTPATIENT)
Age: 70
End: 2025-06-13

## 2025-06-18 ENCOUNTER — TRANSCRIPTION ENCOUNTER (OUTPATIENT)
Age: 70
End: 2025-06-18

## 2025-06-18 ENCOUNTER — APPOINTMENT (OUTPATIENT)
Dept: ENDOCRINOLOGY | Facility: CLINIC | Age: 70
End: 2025-06-18
Payer: MEDICARE

## 2025-06-18 VITALS
HEART RATE: 87 BPM | WEIGHT: 136 LBS | HEIGHT: 65 IN | DIASTOLIC BLOOD PRESSURE: 67 MMHG | TEMPERATURE: 96 F | OXYGEN SATURATION: 97 % | RESPIRATION RATE: 16 BRPM | BODY MASS INDEX: 22.66 KG/M2 | SYSTOLIC BLOOD PRESSURE: 126 MMHG

## 2025-06-18 PROCEDURE — 99214 OFFICE O/P EST MOD 30 MIN: CPT

## 2025-06-18 PROCEDURE — G2211 COMPLEX E/M VISIT ADD ON: CPT

## 2025-06-24 ENCOUNTER — APPOINTMENT (OUTPATIENT)
Dept: OTOLARYNGOLOGY | Facility: CLINIC | Age: 70
End: 2025-06-24
Payer: MEDICARE

## 2025-06-24 VITALS
OXYGEN SATURATION: 99 % | DIASTOLIC BLOOD PRESSURE: 69 MMHG | BODY MASS INDEX: 22.99 KG/M2 | HEART RATE: 64 BPM | WEIGHT: 138 LBS | HEIGHT: 65 IN | SYSTOLIC BLOOD PRESSURE: 117 MMHG

## 2025-06-24 PROBLEM — R26.89 IMBALANCE: Status: ACTIVE | Noted: 2025-06-24

## 2025-06-24 PROCEDURE — 99214 OFFICE O/P EST MOD 30 MIN: CPT

## 2025-06-24 PROCEDURE — 92567 TYMPANOMETRY: CPT

## 2025-06-24 PROCEDURE — 92557 COMPREHENSIVE HEARING TEST: CPT

## 2025-07-07 ENCOUNTER — TRANSCRIPTION ENCOUNTER (OUTPATIENT)
Age: 70
End: 2025-07-07

## 2025-07-08 ENCOUNTER — APPOINTMENT (OUTPATIENT)
Dept: RHEUMATOLOGY | Facility: CLINIC | Age: 70
End: 2025-07-08
Payer: MEDICARE

## 2025-07-08 VITALS
HEART RATE: 60 BPM | DIASTOLIC BLOOD PRESSURE: 91 MMHG | SYSTOLIC BLOOD PRESSURE: 120 MMHG | WEIGHT: 137 LBS | OXYGEN SATURATION: 98 % | RESPIRATION RATE: 16 BRPM | BODY MASS INDEX: 22.82 KG/M2 | HEIGHT: 65 IN

## 2025-07-08 PROBLEM — R76.8 ANA POSITIVE: Status: ACTIVE | Noted: 2025-07-08

## 2025-07-08 PROBLEM — I73.00 RAYNAUD PHENOMENON: Status: ACTIVE | Noted: 2025-07-08

## 2025-07-08 PROCEDURE — 99204 OFFICE O/P NEW MOD 45 MIN: CPT

## 2025-07-08 PROCEDURE — G2211 COMPLEX E/M VISIT ADD ON: CPT

## 2025-07-11 ENCOUNTER — RESULT REVIEW (OUTPATIENT)
Age: 70
End: 2025-07-11

## 2025-07-11 ENCOUNTER — OUTPATIENT (OUTPATIENT)
Dept: OUTPATIENT SERVICES | Facility: HOSPITAL | Age: 70
LOS: 1 days | End: 2025-07-11
Payer: MEDICARE

## 2025-07-11 VITALS
SYSTOLIC BLOOD PRESSURE: 123 MMHG | RESPIRATION RATE: 16 BRPM | HEIGHT: 65 IN | WEIGHT: 136.91 LBS | OXYGEN SATURATION: 99 % | HEART RATE: 53 BPM | DIASTOLIC BLOOD PRESSURE: 69 MMHG | TEMPERATURE: 98 F

## 2025-07-11 VITALS
DIASTOLIC BLOOD PRESSURE: 64 MMHG | OXYGEN SATURATION: 100 % | HEART RATE: 47 BPM | RESPIRATION RATE: 18 BRPM | SYSTOLIC BLOOD PRESSURE: 110 MMHG

## 2025-07-11 DIAGNOSIS — Z98.890 OTHER SPECIFIED POSTPROCEDURAL STATES: Chronic | ICD-10-CM

## 2025-07-11 DIAGNOSIS — Q24.8 OTHER SPECIFIED CONGENITAL MALFORMATIONS OF HEART: ICD-10-CM

## 2025-07-11 DIAGNOSIS — Z98.89 OTHER SPECIFIED POSTPROCEDURAL STATES: Chronic | ICD-10-CM

## 2025-07-11 PROBLEM — R56.9 UNSPECIFIED CONVULSIONS: Chronic | Status: INACTIVE | Noted: 2022-04-04 | Resolved: 2025-07-11

## 2025-07-11 PROCEDURE — 93312 ECHO TRANSESOPHAGEAL: CPT | Mod: 26

## 2025-07-11 PROCEDURE — 93325 DOPPLER ECHO COLOR FLOW MAPG: CPT | Mod: 26,GC

## 2025-07-11 PROCEDURE — 93320 DOPPLER ECHO COMPLETE: CPT | Mod: 26,GC

## 2025-07-11 PROCEDURE — 76376 3D RENDER W/INTRP POSTPROCES: CPT | Mod: 26

## 2025-07-11 RX ORDER — ATORVASTATIN CALCIUM 80 MG/1
1 TABLET, FILM COATED ORAL
Refills: 0 | DISCHARGE

## 2025-07-11 RX ORDER — DORZOLAMIDE 20 MG/ML
1 SOLUTION/ DROPS OPHTHALMIC
Refills: 0 | DISCHARGE

## 2025-07-11 RX ORDER — NETARSUDIL AND LATANOPROST OPHTHALMIC SOLUTION, 0.02%/0.005% .2; .05 MG/ML; MG/ML
1 SOLUTION/ DROPS OPHTHALMIC; TOPICAL
Refills: 0 | DISCHARGE

## 2025-07-11 RX ORDER — ONDANSETRON HCL/PF 4 MG/2 ML
4 VIAL (ML) INJECTION ONCE
Refills: 0 | Status: DISCONTINUED | OUTPATIENT
Start: 2025-07-11 | End: 2025-07-25

## 2025-07-11 RX ORDER — LEVETIRACETAM 10 MG/ML
2 INJECTION, SOLUTION INTRAVENOUS
Refills: 0 | DISCHARGE

## 2025-07-11 RX ORDER — CYANOCOBALAMIN 1000 UG/ML
1 INJECTION INTRAMUSCULAR; SUBCUTANEOUS
Refills: 0 | DISCHARGE

## 2025-07-11 RX ORDER — ASPIRIN 325 MG
1 TABLET ORAL
Refills: 0 | DISCHARGE

## 2025-07-11 NOTE — H&P CARDIOLOGY - NSICDXPASTMEDICALHX_GEN_ALL_CORE_FT
PAST MEDICAL HISTORY:  CVA (cerebrovascular accident)     Glaucoma     HLD (hyperlipidemia)     Seizure disorder     SS (spinal stenosis)

## 2025-07-11 NOTE — ASU DISCHARGE PLAN (ADULT/PEDIATRIC) - CARE PROVIDER_API CALL
Magdalena Olsen  Cardiovascular Disease  [address], [address]  [place] [address]  Phone: [phone]  Fax: [phone]  Established Patient  Follow Up Time:

## 2025-07-11 NOTE — ASU DISCHARGE PLAN (ADULT/PEDIATRIC) - ASU DC SPECIAL INSTRUCTIONSFT
DIET:  - A mild sore throat is expected. If you are unable to swallow water, experience severe nausea, vomiting, coughing, bleeding, or fever, do not take anything else by mouth. Please call the office at [phone] immediately and ask to speak to a cardiologist. If you are unable to reach the office, please proceed to the nearest emergency room.    ACTIVITY:  For the next 24 hours:  - Do not drive or operate heavy machinery.  - Do not drink any alcoholic beverages.  - Do not make any important decisions or sign legal documents.  - Resume normal activity 24 hours post procedural completion.    MEDICATION:   - Take your medications as explained (see attached medication reconciliation on discharge paperwork).    ADDITIONAL INSTRUCTIONS:  - Call your doctor to make or confirm your follow up appointment.  - If you are unable to get in contact with your doctor, you may contact the [facility] at [phone].  - Please reference your discharge instructions for any questions or concerns.

## 2025-07-11 NOTE — ASU DISCHARGE PLAN (ADULT/PEDIATRIC) - FINANCIAL ASSISTANCE
[facility] provides services at a reduced cost to those who are determined to be eligible through [facility]’s financial assistance program. Information regarding [facility]’s financial assistance program can be found by going to [email or web address] or by calling [phone].

## 2025-07-11 NOTE — H&P CARDIOLOGY - HISTORY OF PRESENT ILLNESS
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 Pt was hospitalized at [facility] in May 2025 secondary to ataxia. Pt underwent MRI head which was negative for acute infarct. Echocardiogram showed normal LV function with a possible intra-atrial shunt. Pt was discharged and followed up with her cardiologist, Dr. Olsen, who recommends ESTHER to evaluate for shunt. Pt denies fever, chills, recent travel, headache, visual deficits, chest pain, shortness of breath, orthopnea, palpitations, abdominal pain, N/V/D/C, hematochezia, melena, dysuria, hematuria, LOC, syncope, peripheral edema.    May 12, 2025 - Echo: Left ventricular cavity is normal in size. Left ventricular wall thickness is normal. Left ventricular systolic function is normal with an ejection fraction of 63% by Agee's method of disks. There are no regional wall motion abnormalities seen. Normal right ventricular cavity size and normal right ventricular systolic function. Trileaflet aortic valve. No aortic valve stenosis. Mild aortic regurgitation. Mild mitral regurgitation. Pulmonic valve was not well visualized. Mild to moderate pulmonic regurgitation. Agitated saline injection reveals bubbles in the left heart, conistent with intra-atrial shunt. No echocardiographic evidence of pulmonary hypertension. No pericardial effusion seen. No prior echocardiogram is available for comparison.    NPO date and time: 7/10/2025 at 17:00  Mallampati: 2  Anticoagulation date and time? N/A  Previous endoscopy? Yes    History of:  CVA? Yes  Sleep apnea? No  Dentures? No  Loose teeth? No    Patient denies:  Prior difficult intubation or airway problems  Odynophagia  Dysphagia  Esophageal stricture  Esophageal tumor  Esophageal varices  Esophageal perforation/laceration  Esophageal diverticulum  Large diaphragmatic hernia  Active or recent upper gastrointestinal (GI) bleed  History of GI surgery  History of esophageal surgery  History of Sanchez's esophagus  Tenuous cardiorespiratory status  Cervical spine arthritis with reduced range of motion or atlantoaxial joint disease  History of radiation to head, neck, or mediastinum  Severe thrombocytopenia    Cardiologist: Dr. Magdalena Olsen

## 2025-07-11 NOTE — H&P CARDIOLOGY - BMI (KG/M2)
22.78 Area H Indication Text: Tumors in this location are included in Area H (eyelids, eyebrows, nose, lips, chin, ear, pre-auricular, post-auricular, temple, genitalia, hands, feet, ankles and areola).  Tissue conservation is critical in these anatomic locations.

## 2025-07-14 LAB
CREAT SPEC-SCNC: 33 MG/DL
CREAT/PROT UR: 0.2 RATIO
PROT UR-MCNC: 5 MG/DL

## 2025-07-15 ENCOUNTER — TRANSCRIPTION ENCOUNTER (OUTPATIENT)
Age: 70
End: 2025-07-15

## 2025-07-15 LAB
B2 GLYCOPROT1 IGA SERPL IA-ACNC: <2 U/ML
B2 GLYCOPROT1 IGG SERPL IA-ACNC: <1.4 U/ML
B2 GLYCOPROT1 IGM SERPL IA-ACNC: 4.7 U/ML
C3 SERPL-MCNC: 112 MG/DL
C4 SERPL-MCNC: 35 MG/DL
CARDIOLIPIN IGA SER IA-ACNC: <2 APL U/ML
CARDIOLIPIN IGG SER IA-ACNC: <1.6 GPL U/ML
CARDIOLIPIN IGM SER IA-ACNC: 5.4 MPL U/ML
CENTROMERE B AB SER-ACNC: <0.2 AL
CONFIRM: 25.4 SEC
DRVVT IMM 1:2 NP PPP: NORMAL
DRVVT SCREEN TO CONFIRM RATIO: 0.8 RATIO
DSDNA AB SER-ACNC: <1 IU/ML
ENA RNP AB SER-ACNC: 0.3 AL
ENA SCL70 AB SER-ACNC: <0.2 AL
ENA SM AB SER-ACNC: <0.2 AL
ENA SS-A AB SER-ACNC: <0.2 AL
ENA SS-B AB SER-ACNC: <0.2 AL
SCREEN DRVVT: 26.6 SEC
SILICA CLOTTING TIME INTERPRETATION: NORMAL
SILICA CLOTTING TIME S/C: 1.03 RATIO

## 2025-07-17 PROBLEM — R94.6 BORDERLINE ABNORMAL TFTS: Status: ACTIVE | Noted: 2025-06-18

## 2025-07-17 LAB — CRYOGLOB SERPL-MCNC: NEGATIVE

## 2025-07-29 PROBLEM — H40.9 UNSPECIFIED GLAUCOMA: Chronic | Status: ACTIVE | Noted: 2025-07-11

## 2025-07-29 PROBLEM — G40.909 EPILEPSY, UNSPECIFIED, NOT INTRACTABLE, WITHOUT STATUS EPILEPTICUS: Chronic | Status: ACTIVE | Noted: 2025-07-11

## 2025-07-29 PROBLEM — M48.00 SPINAL STENOSIS, SITE UNSPECIFIED: Chronic | Status: ACTIVE | Noted: 2025-07-11

## 2025-08-01 ENCOUNTER — OUTPATIENT (OUTPATIENT)
Dept: OUTPATIENT SERVICES | Facility: HOSPITAL | Age: 70
LOS: 1 days | End: 2025-08-01
Payer: MEDICARE

## 2025-08-01 ENCOUNTER — TRANSCRIPTION ENCOUNTER (OUTPATIENT)
Age: 70
End: 2025-08-01

## 2025-08-01 VITALS
SYSTOLIC BLOOD PRESSURE: 111 MMHG | OXYGEN SATURATION: 100 % | RESPIRATION RATE: 17 BRPM | DIASTOLIC BLOOD PRESSURE: 60 MMHG | HEART RATE: 60 BPM

## 2025-08-01 VITALS
HEIGHT: 65 IN | OXYGEN SATURATION: 100 % | SYSTOLIC BLOOD PRESSURE: 140 MMHG | RESPIRATION RATE: 18 BRPM | TEMPERATURE: 98 F | DIASTOLIC BLOOD PRESSURE: 65 MMHG | HEART RATE: 56 BPM | WEIGHT: 136.03 LBS

## 2025-08-01 DIAGNOSIS — Z98.890 OTHER SPECIFIED POSTPROCEDURAL STATES: Chronic | ICD-10-CM

## 2025-08-01 DIAGNOSIS — I63.9 CEREBRAL INFARCTION, UNSPECIFIED: ICD-10-CM

## 2025-08-01 PROCEDURE — C1764: CPT

## 2025-08-01 PROCEDURE — 33285 INSJ SUBQ CAR RHYTHM MNTR: CPT

## 2025-08-01 RX ORDER — CEPHALEXIN 250 MG/1
500 CAPSULE ORAL ONCE
Refills: 0 | Status: COMPLETED | OUTPATIENT
Start: 2025-08-01 | End: 2025-08-01

## 2025-08-01 RX ADMIN — CEPHALEXIN 500 MILLIGRAM(S): 250 CAPSULE ORAL at 09:56

## 2025-08-08 ENCOUNTER — RX RENEWAL (OUTPATIENT)
Age: 70
End: 2025-08-08

## 2025-08-22 ENCOUNTER — APPOINTMENT (OUTPATIENT)
Dept: NEUROLOGY | Facility: CLINIC | Age: 70
End: 2025-08-22

## 2025-08-25 ENCOUNTER — TRANSCRIPTION ENCOUNTER (OUTPATIENT)
Age: 70
End: 2025-08-25

## 2025-08-25 ENCOUNTER — NON-APPOINTMENT (OUTPATIENT)
Age: 70
End: 2025-08-25

## 2025-08-26 ENCOUNTER — TRANSCRIPTION ENCOUNTER (OUTPATIENT)
Age: 70
End: 2025-08-26

## 2025-09-03 ENCOUNTER — TRANSCRIPTION ENCOUNTER (OUTPATIENT)
Age: 70
End: 2025-09-03